# Patient Record
Sex: FEMALE | Race: WHITE | NOT HISPANIC OR LATINO | Employment: OTHER | URBAN - METROPOLITAN AREA
[De-identification: names, ages, dates, MRNs, and addresses within clinical notes are randomized per-mention and may not be internally consistent; named-entity substitution may affect disease eponyms.]

---

## 2015-09-22 LAB — CRC RECOMMENDATION EXT: NORMAL

## 2021-09-29 LAB — BCS RECOMMENDATION EXT: NORMAL

## 2022-01-11 LAB
BUN SERPL-MCNC: 16 MG/DL (ref 7–18)
CALCIUM SERPL-MCNC: 10.3 MG/DL (ref 8.5–10.1)
CHLORIDE SERPL-SCNC: 101 MMOL/L (ref 98–107)
CHOLEST SERPL-MCNC: 166 MG/DL
CO2 SERPL-SCNC: 25 MMOL/L (ref 21–32)
CREAT SERPL-MCNC: 1.07 MG/DL (ref 0.6–1.3)
GLUCOSE SERPL-MCNC: 104 MG/DL (ref 74–106)
HDLC SERPL-MCNC: 45 MG/DL (ref 35–60)
LDLC SERPL CALC-MCNC: 94 MG/DL
POTASSIUM SERPL-SCNC: 4.5 MMOL/L (ref 3.5–5.1)
SODIUM SERPL-SCNC: 141 MEQ/L (ref 131–145)
TRIGL SERPL-MCNC: 157 MG/DL (ref 30–150)

## 2022-04-07 ENCOUNTER — HISTORICAL (OUTPATIENT)
Dept: ADMINISTRATIVE | Facility: HOSPITAL | Age: 67
End: 2022-04-07
Payer: MEDICARE

## 2022-04-24 VITALS
OXYGEN SATURATION: 97 % | HEIGHT: 61 IN | DIASTOLIC BLOOD PRESSURE: 78 MMHG | WEIGHT: 154 LBS | SYSTOLIC BLOOD PRESSURE: 136 MMHG | BODY MASS INDEX: 29.07 KG/M2

## 2022-06-08 ENCOUNTER — LAB REQUISITION (OUTPATIENT)
Dept: LAB | Facility: HOSPITAL | Age: 67
End: 2022-06-08
Payer: MEDICARE

## 2022-06-08 DIAGNOSIS — L73.8 OTHER SPECIFIED FOLLICULAR DISORDERS: ICD-10-CM

## 2022-06-08 PROCEDURE — 87070 CULTURE OTHR SPECIMN AEROBIC: CPT | Performed by: DERMATOLOGY

## 2022-06-10 LAB — BACTERIA SPEC CULT: ABNORMAL

## 2022-08-03 ENCOUNTER — PATIENT MESSAGE (OUTPATIENT)
Dept: INTERNAL MEDICINE | Facility: CLINIC | Age: 67
End: 2022-08-03
Payer: MEDICARE

## 2022-08-03 DIAGNOSIS — I10 HYPERTENSION, UNSPECIFIED TYPE: ICD-10-CM

## 2022-08-03 DIAGNOSIS — E03.9 HYPOTHYROIDISM, UNSPECIFIED TYPE: ICD-10-CM

## 2022-08-03 DIAGNOSIS — E78.5 HYPERLIPIDEMIA, UNSPECIFIED HYPERLIPIDEMIA TYPE: ICD-10-CM

## 2022-08-03 DIAGNOSIS — Z11.59 NEED FOR HEPATITIS C SCREENING TEST: Primary | ICD-10-CM

## 2022-08-30 ENCOUNTER — PATIENT MESSAGE (OUTPATIENT)
Dept: INTERNAL MEDICINE | Facility: CLINIC | Age: 67
End: 2022-08-30
Payer: MEDICARE

## 2022-09-15 ENCOUNTER — HISTORICAL (OUTPATIENT)
Dept: ADMINISTRATIVE | Facility: HOSPITAL | Age: 67
End: 2022-09-15
Payer: MEDICARE

## 2022-11-14 ENCOUNTER — DOCUMENTATION ONLY (OUTPATIENT)
Dept: INTERNAL MEDICINE | Facility: CLINIC | Age: 67
End: 2022-11-14
Payer: MEDICARE

## 2022-12-20 ENCOUNTER — DOCUMENTATION ONLY (OUTPATIENT)
Dept: INTERNAL MEDICINE | Facility: CLINIC | Age: 67
End: 2022-12-20
Payer: MEDICARE

## 2023-01-13 ENCOUNTER — TELEPHONE (OUTPATIENT)
Dept: INTERNAL MEDICINE | Facility: CLINIC | Age: 68
End: 2023-01-13
Payer: MEDICARE

## 2023-01-13 NOTE — TELEPHONE ENCOUNTER
----- Message from Latoya Vaughan LPN sent at 1/13/2023 10:51 AM CST -----  Regarding: FOREST Sanchez 1/20/23 @0900  Are there any outstanding tasks in the chart? Pt will need fasting labs     Is there any documentation of tasks? no    Has patient seen another physician, been to the ER, [facility], or admitted to hospital since last visit?    Has the patient done blood work or imaging since last visit?

## 2023-01-14 LAB
ABO GROUP BLD: NORMAL
ALBUMIN SERPL-MCNC: 5.2 G/DL (ref 3.8–4.8)
ALBUMIN/GLOB SERPL: 2 {RATIO} (ref 1.2–2.2)
ALP SERPL-CCNC: 84 IU/L (ref 44–121)
ALT SERPL-CCNC: 32 IU/L (ref 0–32)
AST SERPL-CCNC: 31 IU/L (ref 0–40)
BASOPHILS # BLD AUTO: 0 X10E3/UL (ref 0–0.2)
BASOPHILS NFR BLD AUTO: 1 %
BILIRUB SERPL-MCNC: 0.4 MG/DL (ref 0–1.2)
BUN SERPL-MCNC: 17 MG/DL (ref 8–27)
BUN/CREAT SERPL: 18 (ref 12–28)
CALCIUM SERPL-MCNC: 10.5 MG/DL (ref 8.7–10.3)
CHLORIDE SERPL-SCNC: 96 MMOL/L (ref 96–106)
CHOLEST SERPL-MCNC: 186 MG/DL (ref 100–199)
CO2 SERPL-SCNC: 26 MMOL/L (ref 20–29)
CREAT SERPL-MCNC: 0.94 MG/DL (ref 0.57–1)
EOSINOPHIL # BLD AUTO: 0.1 X10E3/UL (ref 0–0.4)
EOSINOPHIL NFR BLD AUTO: 2 %
ERYTHROCYTE [DISTWIDTH] IN BLOOD BY AUTOMATED COUNT: 12.4 % (ref 11.7–15.4)
EST. GFR  (NO RACE VARIABLE): 67 ML/MIN/1.73
GLOBULIN SER CALC-MCNC: 2.6 G/DL (ref 1.5–4.5)
GLUCOSE SERPL-MCNC: 101 MG/DL (ref 70–99)
HCT VFR BLD AUTO: 42.2 % (ref 34–46.6)
HCV AB S/CO SERPL IA: <0.1 S/CO RATIO (ref 0–0.9)
HDLC SERPL-MCNC: 43 MG/DL
HGB BLD-MCNC: 14.3 G/DL (ref 11.1–15.9)
IMM GRANULOCYTES NFR BLD AUTO: 0 %
LDLC SERPL CALC-MCNC: 110 MG/DL (ref 0–99)
LYMPHOCYTES # BLD AUTO: 2.7 X10E3/UL (ref 0.7–3.1)
LYMPHOCYTES NFR BLD AUTO: 35 %
MCH RBC QN AUTO: 33.6 PG (ref 26.6–33)
MCHC RBC AUTO-ENTMCNC: 33.9 G/DL (ref 31.5–35.7)
MCV RBC AUTO: 99 FL (ref 79–97)
MONOCYTES # BLD AUTO: 0.6 X10E3/UL (ref 0.1–0.9)
MONOCYTES NFR BLD AUTO: 8 %
NEUTROPHILS # BLD AUTO: 4.1 X10E3/UL (ref 1.4–7)
NEUTROPHILS NFR BLD AUTO: 54 %
PLATELET # BLD AUTO: 414 X10E3/UL (ref 150–450)
POTASSIUM SERPL-SCNC: 4.7 MMOL/L (ref 3.5–5.2)
PROT SERPL-MCNC: 7.8 G/DL (ref 6–8.5)
RBC # BLD AUTO: 4.26 X10E6/UL (ref 3.77–5.28)
RH BLD: POSITIVE
SODIUM SERPL-SCNC: 140 MMOL/L (ref 134–144)
SPECIMEN STATUS REPORT: NORMAL
T4 FREE SERPL-MCNC: 1.83 NG/DL (ref 0.82–1.77)
TRIGL SERPL-MCNC: 189 MG/DL (ref 0–149)
TSH SERPL DL<=0.005 MIU/L-ACNC: 0.82 UIU/ML (ref 0.45–4.5)
VLDLC SERPL CALC-MCNC: 33 MG/DL (ref 5–40)
WBC # BLD AUTO: 7.5 X10E3/UL (ref 3.4–10.8)

## 2023-01-20 ENCOUNTER — OFFICE VISIT (OUTPATIENT)
Dept: INTERNAL MEDICINE | Facility: CLINIC | Age: 68
End: 2023-01-20
Payer: MEDICARE

## 2023-01-20 VITALS
RESPIRATION RATE: 18 BRPM | OXYGEN SATURATION: 99 % | DIASTOLIC BLOOD PRESSURE: 72 MMHG | WEIGHT: 153 LBS | HEIGHT: 61 IN | SYSTOLIC BLOOD PRESSURE: 148 MMHG | BODY MASS INDEX: 28.89 KG/M2 | HEART RATE: 102 BPM

## 2023-01-20 DIAGNOSIS — Z00.00 ENCOUNTER FOR MEDICARE ANNUAL WELLNESS EXAM: ICD-10-CM

## 2023-01-20 DIAGNOSIS — I10 BENIGN HYPERTENSION: ICD-10-CM

## 2023-01-20 DIAGNOSIS — R01.1 MURMUR: ICD-10-CM

## 2023-01-20 DIAGNOSIS — E03.9 HYPOTHYROIDISM, UNSPECIFIED TYPE: ICD-10-CM

## 2023-01-20 DIAGNOSIS — E78.5 HYPERLIPIDEMIA, UNSPECIFIED HYPERLIPIDEMIA TYPE: ICD-10-CM

## 2023-01-20 PROBLEM — L68.0 HIRSUTISM: Status: ACTIVE | Noted: 2023-01-20

## 2023-01-20 PROCEDURE — G0439 PR MEDICARE ANNUAL WELLNESS SUBSEQUENT VISIT: ICD-10-PCS | Mod: ,,, | Performed by: INTERNAL MEDICINE

## 2023-01-20 PROCEDURE — G0439 PPPS, SUBSEQ VISIT: HCPCS | Mod: ,,, | Performed by: INTERNAL MEDICINE

## 2023-01-20 RX ORDER — MONTELUKAST SODIUM 10 MG/1
10 TABLET ORAL NIGHTLY
COMMUNITY
Start: 2022-10-07 | End: 2023-01-23

## 2023-01-20 RX ORDER — VALACYCLOVIR HYDROCHLORIDE 500 MG/1
TABLET, FILM COATED ORAL
COMMUNITY
Start: 2022-10-07

## 2023-01-20 RX ORDER — VALSARTAN 80 MG/1
80 TABLET ORAL DAILY
Qty: 90 TABLET | Refills: 3 | Status: SHIPPED | OUTPATIENT
Start: 2023-01-20 | End: 2023-09-20 | Stop reason: SDUPTHER

## 2023-01-20 RX ORDER — HYDROCHLOROTHIAZIDE 25 MG/1
25 TABLET ORAL DAILY
COMMUNITY
Start: 2022-01-18 | End: 2023-03-17

## 2023-01-20 RX ORDER — PROGESTERONE 100 MG/1
100 CAPSULE ORAL DAILY
COMMUNITY
Start: 2022-11-12

## 2023-01-20 RX ORDER — KETOCONAZOLE 20 MG/ML
SHAMPOO, SUSPENSION TOPICAL
COMMUNITY
Start: 2022-08-18 | End: 2024-03-25

## 2023-01-20 RX ORDER — ROSUVASTATIN CALCIUM 10 MG/1
10 TABLET, COATED ORAL DAILY
Qty: 90 TABLET | Refills: 3 | Status: SHIPPED | OUTPATIENT
Start: 2023-01-20 | End: 2023-11-08

## 2023-01-20 RX ORDER — ROSUVASTATIN CALCIUM 5 MG/1
5 TABLET, COATED ORAL NIGHTLY
COMMUNITY
Start: 2022-01-18 | End: 2023-01-20

## 2023-01-20 RX ORDER — LEVOTHYROXINE SODIUM 112 UG/1
112 TABLET ORAL
COMMUNITY
Start: 2022-01-18 | End: 2023-01-23

## 2023-01-20 NOTE — PROGRESS NOTES
Subjective:        Patient Care Team:  Noris Sanchez MD as PCP - General (Internal Medicine)  Noris Sanchez MD     Chief Complaint: Medicare AWV (Discuss labs /)      HPI:She is here for a medicare wellness.  No complaints.  She does check her BP at home.  This morning it was 135/88.  Its usually [id number].  She isn't exercising.  She occ feels a tightness in her chest but not chest pain.  No palpitations.  No excessive cold or heat intolerance.  No tremor.   Problem Noted   Hirsutism 2023   Benign Hypertension 2023   Hyperlipidemia 2023   Hypothyroidism 2023   Encounter for Medicare Annual Wellness Exam 2023   Murmur 2023        The patient's Health Maintenance was reviewed and the following appears to be due:   Health Maintenance Due   Topic Date Due    COVID-19 Vaccine (5 - Booster) 2022    Influenza Vaccine (1) 2022       Past Medical History:  Past Medical History:   Diagnosis Date    Abnormal facial hair     Benign essential HTN     HLD (hyperlipidemia)     Hypothyroidism, unspecified      Past Surgical History:   Procedure Laterality Date     SECTION  15 years ago    HYSTERECTOMY  13 years ago    SKIN CANCER EXCISION Bilateral     2022, Oct 2022    TONSILLECTOMY  When i was 9 years old    TOTAL ABDOMINAL HYSTERECTOMY W/ BILATERAL SALPINGOOPHORECTOMY       Review of patient's allergies indicates:  No Known Allergies  Current Outpatient Medications on File Prior to Visit   Medication Sig Dispense Refill    hydroCHLOROthiazide (HYDRODIURIL) 25 MG tablet Take 25 mg by mouth once daily.      ketoconazole (NIZORAL) 2 % shampoo       levothyroxine (SYNTHROID) 112 MCG tablet Take 112 mcg by mouth before breakfast.      montelukast (SINGULAIR) 10 mg tablet Take 10 mg by mouth every evening.      progesterone (PROMETRIUM) 100 MG capsule Take 100 mg by mouth once daily.      valACYclovir (VALTREX) 500 MG tablet       [DISCONTINUED] rosuvastatin  (CRESTOR) 5 MG tablet Take 5 mg by mouth every evening.       No current facility-administered medications on file prior to visit.     Social History     Socioeconomic History    Marital status:    Tobacco Use    Smoking status: Never    Smokeless tobacco: Never   Substance and Sexual Activity    Alcohol use: Yes     Alcohol/week: 3.0 standard drinks     Types: 3 Glasses of wine per week    Drug use: Never    Sexual activity: Yes     Partners: Male     Birth control/protection: None     Family History   Problem Relation Age of Onset    Hyperlipidemia Mother     Hypertension Mother     Heart disease Mother     Hypertension Father     Stroke Father     Heart disease Father     Breast cancer Sister        Review of Systems    Patient Reported Health Risk Assessment  What [age] 65-69  Are you male or female?: Female  During the past four weeks, how much have you been bothered by emotional problems such as feeling anxious, depressed, irritable, sad, or downhearted and blue?: Not at all  During the past five weeks, has your physical and/or emotional health limited your social activities with family, friends, neighbors, or groups?: Not at all  During the past four weeks, how much bodily pain have you generally had?: No pain  During the past four weeks, was someone available to help if you needed and wanted help?: Yes, as much as I wanted  During the past four weeks, what was the hardest physical activity you could do for at least two minutes?: Moderate  Can you get to places out of walking distance without help?  (For example, can you travel alone on buses or taxis, or drive your own car?): Yes  Can you go shopping for groceries or clothes without someone's help?: Yes  Can you prepare your own meals?: Yes  Can you do your own housework without help?: Yes  Because of any health problems, do you need the help of another person with your personal care needs such as eating, bathing, dressing, or getting around  the house?: No  Can you handle your own money without help?: Yes  During the past four weeks, how would you rate your health in general?: Excellent  How have things been going for you during the past four weeks?: Very well  Are you having difficulties driving your car?: No  Do you always fasten your seat belt when you are in a car?: Yes, usually  How often in the past four weeks have you been bothered by falling or dizzy when standing up?: Never  How often in the past four weeks have you been bothered by sexual problems?: Never  How often in the past four weeks have you been bothered by trouble eating well?: Never  How often in the past four weeks have you been bothered by teeth or denture problems?: Never  How often in the past four weeks have you been bothered with problems using the telephone?: Never  How often in the past four weeks have you been bothered by tiredness or fatigue?: Never  Have you fallen two or more times in the past year?: No  Are you afraid of falling?: No  Are you a smoker?: No  During the past four weeks, how many drinks of wine, beer, or other alcoholic beverages did you have?: 2-5 drinks per weeks  Do you exercise for about 20 minutes three or more days a week?: No, I usually do not exercise this much  Have you been given any information to help you with hazards in your house that might hurt you?: Yes  Have you been given any information to help you with keeping track of your medications?: Yes  How often do you have trouble taking medicines the way you've been told to take them?: I always take them as prescribed  How confident are you that you can control and manage most of your health problems?: Very confident  What is your race? (Check all that apply.):     Opioid Screening: Patient medication list reviewed, patient is not taking prescription opioids. Patient is not using additional opioids than prescribed. Patient is at low risk of substance abuse based on this opioid use  "history.     Objective:   BP (!) 148/72 (BP Location: Right arm, Patient Position: Sitting, BP Method: Small (Manual))   Pulse 102   Resp 18   Ht 5' 0.63" (1.54 m)   Wt 69.4 kg (153 lb)   SpO2 99%   BMI 29.26 kg/m²     Physical Exam  Constitutional:       General: She is not in acute distress.     Appearance: Normal appearance.   HENT:      Head: Normocephalic and atraumatic.   Eyes:      General: No scleral icterus.     Conjunctiva/sclera: Conjunctivae normal.   Neck:      Vascular: No carotid bruit.   Cardiovascular:      Rate and Rhythm: Normal rate and regular rhythm.      Pulses: Normal pulses.      Heart sounds: Murmur (II/VI msm RUSB) heard.     No friction rub. No gallop.   Pulmonary:      Effort: Pulmonary effort is normal.      Breath sounds: Normal breath sounds.   Abdominal:      General: Bowel sounds are normal.      Palpations: Abdomen is soft. There is no mass.      Tenderness: There is no abdominal tenderness. There is no guarding or rebound.   Musculoskeletal:         General: No deformity.      Cervical back: No rigidity or tenderness.      Right lower leg: No edema.      Left lower leg: No edema.   Lymphadenopathy:      Cervical: No cervical adenopathy.   Skin:     Coloration: Skin is not jaundiced or pale.      Findings: No erythema.   Neurological:      General: No focal deficit present.      Mental Status: She is alert and oriented to person, place, and time.      Gait: Gait normal.   Psychiatric:         Mood and Affect: Mood normal.         Behavior: Behavior normal.         Thought Content: Thought content normal.         Judgment: Judgment normal.         No flowsheet data found.  Fall Risk Assessment - Outpatient 1/20/2023   Mobility Status Ambulatory   Number of falls 0   Identified as fall risk 0           Depression Screening  Over the past two weeks, has the patient felt down, depressed, or hopeless?: No  Over the past two weeks, has the patient felt little interest or pleasure " in doing things?: No  Functional Ability/Safety Screening  Was the patient's timed Up & Go test unsteady or longer than 30 seconds?: No  Does the patient need help with phone, transportation, shopping, preparing meals, housework, laundry, meds, or managing money?: No  Does the patient's home have rugs in the hallway, lack grab bars in the bathroom, lack handrails on the stairs or have poor lighting?: No  Have you noticed any hearing difficulties?: No  Cognitive Function (Assessed through direct observation with due consideration of information obtained by way of patient reports and/or concerns raised by family, friends, caretakers, or others)    Does the patient repeat questions/statements in the same day?: No  Does the patient have trouble remembering the date, year, and time?: No  Does the patient have difficulty managing finances?: No  Does the patient have a decreased sense of direction?: No    Assessment and Plan:     Encounter for Medicare annual wellness exam  Health Maintenance Due   Topic Date Due    TETANUS VACCINE  Never done    COVID-19 Vaccine (4 - Booster for Moderna series) 01/03/2022     Recent labs reviewed and discussed.  Dr. Bolton orders her mmg.      Murmur  Will get echo to further evaluate -- no h/o murmur.    Hypothyroidism  Free t4 is a little high,  Tsh is normal.  CCM for now.    Hyperlipidemia  Increase crestor to more moderate dose.    Benign hypertension  Cont hctz, add diovan for better control.  Recheck 6 weeks.     Follow up in about 6 weeks (around 3/3/2023).    Medications Ordered This Encounter   Medications    rosuvastatin (CRESTOR) 10 MG tablet     Sig: Take 1 tablet (10 mg total) by mouth once daily.     Dispense:  90 tablet     Refill:  3    valsartan (DIOVAN) 80 MG tablet     Sig: Take 1 tablet (80 mg total) by mouth once daily.     Dispense:  90 tablet     Refill:  3     .     [unfilled]  Orders Placed This Encounter   Procedures    Echo Saline Bubble? No     Standing  Status:   Future     Standing Expiration Date:   1/20/2024     Order Specific Question:   Limited Echo?     Answer:   No     Order Specific Question:   Saline Bubble?     Answer:   No     Order Specific Question:   Ultrasound enhancing contrast?     Answer:   No     Order Specific Question:   Adult congenital patient?     Answer:   No     Order Specific Question:   Oncology Patient?     Answer:   No     Order Specific Question:   Release to patient     Answer:   Immediate         Medicare Annual Wellness and Personalized Prevention Plan:   Fall Risk + Home Safety + Hearing Impairment + Depression Screen + Cognitive Impairment Screen + Health Risk Assessment all reviewed    Advance Care Planning   I attest to discussing Advance Care Planning with patient and/or family member.  Education was provided including the importance of the Health Care Power of , Advance Directives, and/or [place] documentation.  The patient expressed understanding to the importance of this information and discussion.       Follow up in about 6 weeks (around 3/3/2023). In addition to their scheduled follow up, the patient has also been instructed to follow up on as needed basis.

## 2023-01-20 NOTE — ASSESSMENT & PLAN NOTE
Health Maintenance Due   Topic Date Due    TETANUS VACCINE  Never done    COVID-19 Vaccine (4 - Booster for Moderna series) 01/03/2022     Recent labs reviewed and discussed.  Dr. Bolton orders her mmg.

## 2023-02-13 ENCOUNTER — HOSPITAL ENCOUNTER (OUTPATIENT)
Dept: CARDIOLOGY | Facility: HOSPITAL | Age: 68
Discharge: HOME | End: 2023-02-13
Attending: INTERNAL MEDICINE
Payer: MEDICARE

## 2023-02-13 VITALS — HEIGHT: 60 IN | BODY MASS INDEX: 29.88 KG/M2

## 2023-02-13 DIAGNOSIS — R01.1 MURMUR: ICD-10-CM

## 2023-02-13 LAB
AV INDEX (PROSTH): 0.57
AV MEAN GRADIENT: 6 MMHG
AV PEAK GRADIENT: 12 MMHG
AV REGURGITATION PRESSURE HALF TIME: 365 MS
AV VALVE AREA: 1.79 CM2
AV VELOCITY RATIO: 0.55
CV ECHO LV RWT: 0.53 CM
DOP CALC AO PEAK VEL: 1.7 M/S
DOP CALC AO VTI: 31.1 CM
DOP CALC LVOT AREA: 3.1 CM2
DOP CALC LVOT DIAMETER: 2 CM
DOP CALC LVOT PEAK VEL: 0.93 M/S
DOP CALC LVOT STROKE VOLUME: 55.58 CM3
DOP CALC MV VTI: 31.7 CM
DOP CALCLVOT PEAK VEL VTI: 17.7 CM
E WAVE DECELERATION TIME: 85 MSEC
E/A RATIO: 1.05
E/E' RATIO: 10.24 M/S
ECHO LV POSTERIOR WALL: 1.01 CM (ref 0.6–1.1)
EJECTION FRACTION: 59 %
FRACTIONAL SHORTENING: 30 % (ref 28–44)
INTERVENTRICULAR SEPTUM: 0.87 CM (ref 0.6–1.1)
LEFT ATRIUM SIZE: 3.1 CM
LEFT ATRIUM VOLUME MOD: 37.4 CM3
LEFT INTERNAL DIMENSION IN SYSTOLE: 2.67 CM (ref 2.1–4)
LEFT VENTRICLE DIASTOLIC VOLUME: 61.6 ML
LEFT VENTRICLE SYSTOLIC VOLUME: 26.3 ML
LEFT VENTRICULAR INTERNAL DIMENSION IN DIASTOLE: 3.79 CM (ref 3.5–6)
LEFT VENTRICULAR MASS: 106.97 G
LV LATERAL E/E' RATIO: 9.67 M/S
LV SEPTAL E/E' RATIO: 10.88 M/S
LVOT MG: 2 MMHG
LVOT MV: 0.64 CM/S
MV MEAN GRADIENT: 3 MMHG
MV PEAK A VEL: 0.83 M/S
MV PEAK E VEL: 0.87 M/S
MV PEAK GRADIENT: 8 MMHG
MV STENOSIS PRESSURE HALF TIME: 42 MS
MV VALVE AREA BY CONTINUITY EQUATION: 1.75 CM2
MV VALVE AREA P 1/2 METHOD: 5.24 CM2
PISA AR MAX VEL: 4.67 M/S
PISA TR MAX VEL: 2.01 M/S
PV PEAK VELOCITY: 0.78 CM/S
RA PRESSURE: 3 MMHG
TDI LATERAL: 0.09 M/S
TDI SEPTAL: 0.08 M/S
TDI: 0.09 M/S
TR MAX PG: 16 MMHG
TRICUSPID ANNULAR PLANE SYSTOLIC EXCURSION: 1.78 CM
TV REST PULMONARY ARTERY PRESSURE: 19 MMHG

## 2023-02-13 PROCEDURE — 93306 TTE W/DOPPLER COMPLETE: CPT

## 2023-02-13 PROCEDURE — 93306 TTE W/DOPPLER COMPLETE: CPT | Mod: 26,,, | Performed by: STUDENT IN AN ORGANIZED HEALTH CARE EDUCATION/TRAINING PROGRAM

## 2023-02-13 PROCEDURE — 93306 ECHO (CUPID ONLY): ICD-10-PCS | Mod: 26,,, | Performed by: STUDENT IN AN ORGANIZED HEALTH CARE EDUCATION/TRAINING PROGRAM

## 2023-02-20 ENCOUNTER — TELEPHONE (OUTPATIENT)
Dept: INTERNAL MEDICINE | Facility: CLINIC | Age: 68
End: 2023-02-20
Payer: MEDICARE

## 2023-02-20 NOTE — TELEPHONE ENCOUNTER
----- Message from Noris Sanchez MD sent at 2/17/2023  7:35 AM CST -----  Tell patient the murmur I heard is from mild aortic regurgitation (slightly leaky aortic valve).  This is nothing to worry about at this time.  Mild valvular abnormalities are very common.  We might consider repeating an echo later on down the road if the murmur gets worse, but there is nothing to do at this time.

## 2023-02-27 ENCOUNTER — TELEPHONE (OUTPATIENT)
Dept: INTERNAL MEDICINE | Facility: CLINIC | Age: 68
End: 2023-02-27
Payer: MEDICARE

## 2023-02-27 NOTE — TELEPHONE ENCOUNTER
----- Message from Latoya Vaughan LPN sent at 2/27/2023  3:59 PM CST -----  Regarding: FOREST Sanchez 3/6/23 @11:00a  Are there any outstanding tasks in the chart? no    Is there any documentation of tasks? no    Has patient seen another physician, been to the ER, [facility], or admitted to hospital since last visit?    Has the patient done blood work or imaging since last visit?

## 2023-03-06 ENCOUNTER — OFFICE VISIT (OUTPATIENT)
Dept: INTERNAL MEDICINE | Facility: CLINIC | Age: 68
End: 2023-03-06
Payer: MEDICARE

## 2023-03-06 VITALS
WEIGHT: 155 LBS | RESPIRATION RATE: 18 BRPM | DIASTOLIC BLOOD PRESSURE: 80 MMHG | HEART RATE: 80 BPM | SYSTOLIC BLOOD PRESSURE: 124 MMHG | OXYGEN SATURATION: 99 % | HEIGHT: 60 IN | BODY MASS INDEX: 30.43 KG/M2

## 2023-03-06 DIAGNOSIS — I10 BENIGN HYPERTENSION: Primary | ICD-10-CM

## 2023-03-06 DIAGNOSIS — E78.5 HYPERLIPIDEMIA, UNSPECIFIED HYPERLIPIDEMIA TYPE: ICD-10-CM

## 2023-03-06 PROCEDURE — 99213 OFFICE O/P EST LOW 20 MIN: CPT | Mod: ,,, | Performed by: INTERNAL MEDICINE

## 2023-03-06 PROCEDURE — 99213 PR OFFICE/OUTPT VISIT, EST, LEVL III, 20-29 MIN: ICD-10-PCS | Mod: ,,, | Performed by: INTERNAL MEDICINE

## 2023-03-06 NOTE — PROGRESS NOTES
Subjective:      Chief Complaint: Follow-up (6wk- for BP check/)      HPI:  She is here for f/u htn.  She is tolerating the diovan without issue.  BP has been great.  108-121/60s-77.    Problem Noted   Hirsutism 1/20/2023   Benign Hypertension 1/20/2023   Hyperlipidemia 1/20/2023   Hypothyroidism 1/20/2023   Encounter for Medicare Annual Wellness Exam 1/20/2023   Murmur 1/20/2023        The patient's Health Maintenance was reviewed and the following appears to be due:   Health Maintenance Due   Topic Date Due    Hemoglobin A1c (Diabetic Prevention Screening)  Never done       Past Medical History:  Past Medical History:   Diagnosis Date    Abnormal facial hair     Benign essential HTN     HLD (hyperlipidemia)     Hypothyroidism, unspecified      Review of patient's allergies indicates:  No Known Allergies  Current Outpatient Medications on File Prior to Visit   Medication Sig Dispense Refill    hydroCHLOROthiazide (HYDRODIURIL) 25 MG tablet Take 25 mg by mouth once daily.      ketoconazole (NIZORAL) 2 % shampoo       levothyroxine (SYNTHROID) 112 MCG tablet TAKE 1 TABLET EVERY DAY 90 tablet 3    montelukast (SINGULAIR) 10 mg tablet TAKE 1 TABLET EVERY DAY 90 tablet 3    progesterone (PROMETRIUM) 100 MG capsule Take 100 mg by mouth once daily.      rosuvastatin (CRESTOR) 10 MG tablet Take 1 tablet (10 mg total) by mouth once daily. 90 tablet 3    valACYclovir (VALTREX) 500 MG tablet       valsartan (DIOVAN) 80 MG tablet Take 1 tablet (80 mg total) by mouth once daily. 90 tablet 3     No current facility-administered medications on file prior to visit.       Review of Systems    Objective:   /80 (BP Location: Left arm, Patient Position: Sitting, BP Method: Small (Manual))   Pulse 80   Resp 18   Ht 5' (1.524 m)   Wt 70.3 kg (155 lb)   SpO2 99%   BMI 30.27 kg/m²     Physical Exam  Vitals reviewed.   Constitutional:       General: She is not in acute distress.     Appearance: Normal appearance. She is not  ill-appearing or diaphoretic.   HENT:      Head: Normocephalic and atraumatic.   Pulmonary:      Effort: Pulmonary effort is normal.   Skin:     General: Skin is warm and dry.   Neurological:      General: No focal deficit present.      Mental Status: She is alert.   Psychiatric:         Mood and Affect: Mood normal.         Behavior: Behavior normal.         Thought Content: Thought content normal.         Judgment: Judgment normal.     Assessment and Plan:     Benign hypertension  Improved.  Continue Diovan and hctz.    Hyperlipidemia  Recheck lipids/lft in a month to f/u on addition of crestor.      Follow up in about 6 months (around 9/6/2023).       [unfilled]  Orders Placed This Encounter   Procedures    Lipid Panel     Standing Status:   Future     Number of Occurrences:   1     Standing Expiration Date:   5/4/2024    Comprehensive Metabolic Panel     Standing Status:   Future     Number of Occurrences:   1     Standing Expiration Date:   5/4/2024

## 2023-04-05 LAB
ALBUMIN SERPL-MCNC: 4.7 G/DL (ref 3.8–4.8)
ALBUMIN/GLOB SERPL: 1.9 {RATIO} (ref 1.2–2.2)
ALP SERPL-CCNC: 71 IU/L (ref 44–121)
ALT SERPL-CCNC: 33 IU/L (ref 0–32)
AST SERPL-CCNC: 28 IU/L (ref 0–40)
BILIRUB SERPL-MCNC: 0.5 MG/DL (ref 0–1.2)
BUN SERPL-MCNC: 18 MG/DL (ref 8–27)
BUN/CREAT SERPL: 20 (ref 12–28)
CALCIUM SERPL-MCNC: 10.3 MG/DL (ref 8.7–10.3)
CHLORIDE SERPL-SCNC: 98 MMOL/L (ref 96–106)
CHOLEST SERPL-MCNC: 166 MG/DL (ref 100–199)
CO2 SERPL-SCNC: 25 MMOL/L (ref 20–29)
CREAT SERPL-MCNC: 0.9 MG/DL (ref 0.57–1)
EST. GFR  (NO RACE VARIABLE): 70 ML/MIN/1.73
GLOBULIN SER CALC-MCNC: 2.5 G/DL (ref 1.5–4.5)
GLUCOSE SERPL-MCNC: 101 MG/DL (ref 70–99)
HDLC SERPL-MCNC: 39 MG/DL
LDLC SERPL CALC-MCNC: 95 MG/DL (ref 0–99)
POTASSIUM SERPL-SCNC: 5 MMOL/L (ref 3.5–5.2)
PROT SERPL-MCNC: 7.2 G/DL (ref 6–8.5)
SODIUM SERPL-SCNC: 137 MMOL/L (ref 134–144)
TRIGL SERPL-MCNC: 183 MG/DL (ref 0–149)
VLDLC SERPL CALC-MCNC: 32 MG/DL (ref 5–40)

## 2023-04-06 ENCOUNTER — DOCUMENTATION ONLY (OUTPATIENT)
Dept: INTERNAL MEDICINE | Facility: CLINIC | Age: 68
End: 2023-04-06
Payer: MEDICARE

## 2023-04-24 PROBLEM — Z00.00 ENCOUNTER FOR MEDICARE ANNUAL WELLNESS EXAM: Status: RESOLVED | Noted: 2023-01-20 | Resolved: 2023-04-24

## 2023-07-31 ENCOUNTER — OFFICE VISIT (OUTPATIENT)
Dept: INTERNAL MEDICINE | Facility: CLINIC | Age: 68
End: 2023-07-31
Payer: MEDICARE

## 2023-07-31 VITALS
SYSTOLIC BLOOD PRESSURE: 138 MMHG | WEIGHT: 158 LBS | BODY MASS INDEX: 31.02 KG/M2 | HEART RATE: 56 BPM | OXYGEN SATURATION: 98 % | DIASTOLIC BLOOD PRESSURE: 86 MMHG | HEIGHT: 60 IN | RESPIRATION RATE: 18 BRPM

## 2023-07-31 DIAGNOSIS — M54.50 ACUTE RIGHT-SIDED LOW BACK PAIN WITHOUT SCIATICA: ICD-10-CM

## 2023-07-31 DIAGNOSIS — R60.0 LOWER EXTREMITY EDEMA: ICD-10-CM

## 2023-07-31 LAB
APPEARANCE UR: CLEAR
BACTERIA #/AREA URNS AUTO: ABNORMAL /HPF
BILIRUB UR QL STRIP.AUTO: NEGATIVE
COLOR UR: YELLOW
GLUCOSE UR QL STRIP.AUTO: NEGATIVE
KETONES UR QL STRIP.AUTO: NEGATIVE
LEUKOCYTE ESTERASE UR QL STRIP.AUTO: NEGATIVE
NITRITE UR QL STRIP.AUTO: NEGATIVE
PH UR STRIP.AUTO: 5.5 [PH]
PROT UR QL STRIP.AUTO: NEGATIVE
RBC #/AREA URNS AUTO: 6 /HPF
RBC UR QL AUTO: ABNORMAL
SP GR UR STRIP.AUTO: 1.01 (ref 1–1.03)
SQUAMOUS #/AREA URNS AUTO: <5 /HPF
UROBILINOGEN UR STRIP-ACNC: 0.2
WBC #/AREA URNS AUTO: <5 /HPF

## 2023-07-31 PROCEDURE — 99213 OFFICE O/P EST LOW 20 MIN: CPT | Mod: ,,, | Performed by: INTERNAL MEDICINE

## 2023-07-31 PROCEDURE — 99213 PR OFFICE/OUTPT VISIT, EST, LEVL III, 20-29 MIN: ICD-10-PCS | Mod: ,,, | Performed by: INTERNAL MEDICINE

## 2023-07-31 RX ORDER — MAGNESIUM 250 MG
1 TABLET ORAL DAILY
COMMUNITY

## 2023-07-31 RX ORDER — CALCIUM CARBONATE 600 MG
600 TABLET ORAL DAILY
COMMUNITY

## 2023-07-31 RX ORDER — ACETAMINOPHEN 500 MG
5000 TABLET ORAL DAILY
COMMUNITY

## 2023-07-31 NOTE — PROGRESS NOTES
Subjective:      Chief Complaint: Low-back Pain (C/o back pain while she was on [place]- she wants to do urine test to rule out UTI /C/o ankle and leg swelling )      HPI:She was having some lower back pain and ankle swelling while traveling last week.  The left was worse than the right.  The back pain was a right lower back pain that was very uncomfortable in most positions.  It gradually improved over the past several days.  It was worse with certain movements as it tapered away.  It started about half way thru her trip.  She is worried about a UTI.  But she isn't having any dysuria.  She had some mild pain down into her right leg.  And she also had some burning in the right ankle.  She took motrin 400 mg bid for a few days, but none in 4 days.    The right lower back pain was worse with palpation and with cough.  Her spouse felt she was having difficulty breathing.  Problem Noted   Lower Back Pain 7/31/2023   Lower Extremity Edema 7/31/2023   Hirsutism 1/20/2023   Benign Hypertension 1/20/2023   Hyperlipidemia 1/20/2023   Hypothyroidism 1/20/2023   Murmur 1/20/2023   Encounter for Medicare Annual Wellness Exam (Resolved) 1/20/2023        The patient's Health Maintenance was reviewed and the following appears to be due:   Health Maintenance Due   Topic Date Due    Hemoglobin A1c (Diabetic Prevention Screening)  Never done    COVID-19 Vaccine (6 - Moderna series) 01/03/2022    Mammogram  10/03/2023       Past Medical History:  Past Medical History:   Diagnosis Date    Abnormal facial hair     Benign essential HTN     HLD (hyperlipidemia)     Hypothyroidism, unspecified      Review of patient's allergies indicates:  No Known Allergies  Current Outpatient Medications on File Prior to Visit   Medication Sig Dispense Refill    calcium carbonate (CALCIUM 600) 600 mg calcium (1,500 mg) Tab Take 600 mg by mouth once daily.      cholecalciferol, vitamin D3, 125 mcg (5,000 unit) Tab Take 5,000 Units by mouth  once daily.      hydroCHLOROthiazide (HYDRODIURIL) 25 MG tablet TAKE 1 TABLET EVERY DAY 90 tablet 3    ketoconazole (NIZORAL) 2 % shampoo       levothyroxine (SYNTHROID) 112 MCG tablet TAKE 1 TABLET EVERY DAY 90 tablet 3    magnesium 250 mg Tab Take 1 tablet by mouth once daily.      montelukast (SINGULAIR) 10 mg tablet TAKE 1 TABLET EVERY DAY 90 tablet 3    progesterone (PROMETRIUM) 100 MG capsule Take 100 mg by mouth once daily.      rosuvastatin (CRESTOR) 10 MG tablet Take 1 tablet (10 mg total) by mouth once daily. 90 tablet 3    valACYclovir (VALTREX) 500 MG tablet       valsartan (DIOVAN) 80 MG tablet Take 1 tablet (80 mg total) by mouth once daily. 90 tablet 3     No current facility-administered medications on file prior to visit.       Review of Systems   Constitutional:  Negative for fever.   Cardiovascular:  Negative for chest pain.   Gastrointestinal:  Negative for abdominal pain.   Genitourinary:  Negative for dysuria, hematuria and pelvic pain.   Musculoskeletal:  Positive for back pain.   Neurological:  Negative for weakness, numbness and headaches.       Objective:   /86 (BP Location: Right arm, Patient Position: Sitting, BP Method: Small (Manual))   Pulse (!) 56   Resp 18   Ht 5' (1.524 m)   Wt 71.7 kg (158 lb)   SpO2 98%   BMI 30.86 kg/m²     Physical Exam  Vitals reviewed.   Constitutional:       General: She is not in acute distress.     Appearance: Normal appearance. She is not ill-appearing or diaphoretic.   HENT:      Head: Normocephalic and atraumatic.   Pulmonary:      Effort: Pulmonary effort is normal.   Musculoskeletal:         General: No tenderness (no vertrebral point ttp and no flank pain).      Right lower leg: No edema.      Left lower leg: No edema.   Skin:     General: Skin is warm and dry.   Neurological:      General: No focal deficit present.      Mental Status: She is alert.   Psychiatric:         Mood and Affect: Mood normal.         Behavior: Behavior normal.          Thought Content: Thought content normal.         Judgment: Judgment normal.       Assessment and Plan:     Lower back pain  Will get u/a.  But sx nearly resolved at this point.    Lower extremity edema  Given travel, will check d-dimer.  But likely more related to travel.  Sx improved.      No follow-ups on file.       [unfilled]  Orders Placed This Encounter   Procedures    Urinalysis, Reflex to Urine Culture     Standing Status:   Future     Standing Expiration Date:   8/31/2023     Order Specific Question:   Specimen Source     Answer:   Urine    D-Dimer, Quantitative     Standing Status:   Future     Standing Expiration Date:   9/28/2024       Answers submitted by the patient for this visit:  Back Pain Questionnaire (Submitted on 7/31/2023)  Chief Complaint: Back pain  Chronicity: new  Onset: in the past 7 days  Frequency: constantly  Progression since onset: gradually improving  Pain location: costovertebral angle  Pain quality: aching  Radiates to: does not radiate  Pain - numeric: 5/10  Pain is: the same all the time  Aggravated by: coughing, position, standing  Stiffness is present: all day  leg pain: No  paresis: No  perianal numbness: No  tingling: No  weight loss: No  genital pain: No  Pain severity: moderate     Patient is a 66F with no reported PMH who presents to the ED s/p syncope.  Patient states that she got up from a chair and started to feel lightheaded.  Was able to walk down a flight of stairs but soon afterwards the lightheadedness became worse, patient became dizzy and passed out.  Patient reportedly had LOC for a few seconds, was aroused by her family who then tried to get the patient to her feet however she then syncopized again.  Family denies history of limb shaking, tongue biting, or bowel/bladder incontinence.  Patient reports a similar syncope last year in which she passed out when she was doing dishes.  Patient currently has no active complaints.  Takes no daily medications, allergic to sulfa, SHx - appendectomy many years ago.  Vitals stable, labs benign.  Will admit to tele.

## 2023-08-04 DIAGNOSIS — R31.9 HEMATURIA, UNSPECIFIED TYPE: ICD-10-CM

## 2023-08-04 DIAGNOSIS — M54.50 ACUTE RIGHT-SIDED LOW BACK PAIN WITHOUT SCIATICA: Primary | ICD-10-CM

## 2023-09-07 ENCOUNTER — PATIENT MESSAGE (OUTPATIENT)
Dept: RESEARCH | Facility: HOSPITAL | Age: 68
End: 2023-09-07
Payer: MEDICARE

## 2023-09-13 ENCOUNTER — TELEPHONE (OUTPATIENT)
Dept: INTERNAL MEDICINE | Facility: CLINIC | Age: 68
End: 2023-09-13
Payer: MEDICARE

## 2023-09-13 NOTE — TELEPHONE ENCOUNTER
----- Message from Latoya Vaughan LPN sent at 9/13/2023 11:52 AM CDT -----  Regarding: FOREST Sanchez 9/20/23 @0299  Are there any outstanding tasks in the chart? Pt will need urine test- she should have the order that I mailed    Is there any documentation of tasks? no    Has patient seen another physician, been to the ER, C, or admitted to hospital since last visit?    Has the patient done blood work or imaging since last visit?

## 2023-09-19 ENCOUNTER — PATIENT MESSAGE (OUTPATIENT)
Dept: INTERNAL MEDICINE | Facility: CLINIC | Age: 68
End: 2023-09-19
Payer: MEDICARE

## 2023-09-19 LAB
APPEARANCE UR: CLEAR
BACTERIA #/AREA URNS HPF: ABNORMAL /[HPF]
BILIRUB UR QL STRIP: NEGATIVE
COLOR UR: YELLOW
CRYSTALS URNS MICRO: ABNORMAL
EPI CELLS #/AREA URNS HPF: >10 /HPF (ref 0–10)
GLUCOSE UR QL STRIP: NEGATIVE
HGB UR QL STRIP: ABNORMAL
KETONES UR QL STRIP: NEGATIVE
LEUKOCYTE ESTERASE UR QL STRIP: NEGATIVE
MICRO URNS: ABNORMAL
NITRITE UR QL STRIP: NEGATIVE
PH UR STRIP: 7.5 [PH] (ref 5–7.5)
PROT UR QL STRIP: NEGATIVE
RBC #/AREA URNS HPF: ABNORMAL /HPF (ref 0–2)
SP GR UR STRIP: 1.01 (ref 1–1.03)
URINALYSIS REFLEX: ABNORMAL
UROBILINOGEN UR STRIP-MCNC: 0.2 MG/DL (ref 0.2–1)
WBC #/AREA URNS HPF: ABNORMAL /HPF (ref 0–5)

## 2023-09-20 ENCOUNTER — OFFICE VISIT (OUTPATIENT)
Dept: INTERNAL MEDICINE | Facility: CLINIC | Age: 68
End: 2023-09-20
Payer: MEDICARE

## 2023-09-20 VITALS
DIASTOLIC BLOOD PRESSURE: 68 MMHG | BODY MASS INDEX: 30.43 KG/M2 | HEIGHT: 60 IN | SYSTOLIC BLOOD PRESSURE: 134 MMHG | WEIGHT: 155 LBS | OXYGEN SATURATION: 97 % | RESPIRATION RATE: 18 BRPM | HEART RATE: 85 BPM

## 2023-09-20 DIAGNOSIS — I10 BENIGN HYPERTENSION: Primary | ICD-10-CM

## 2023-09-20 DIAGNOSIS — E78.5 HYPERLIPIDEMIA, UNSPECIFIED HYPERLIPIDEMIA TYPE: ICD-10-CM

## 2023-09-20 DIAGNOSIS — E03.9 HYPOTHYROIDISM, UNSPECIFIED TYPE: ICD-10-CM

## 2023-09-20 DIAGNOSIS — R31.29 MICROSCOPIC HEMATURIA: ICD-10-CM

## 2023-09-20 PROCEDURE — 99213 PR OFFICE/OUTPT VISIT, EST, LEVL III, 20-29 MIN: ICD-10-PCS | Mod: ,,, | Performed by: INTERNAL MEDICINE

## 2023-09-20 PROCEDURE — 99213 OFFICE O/P EST LOW 20 MIN: CPT | Mod: ,,, | Performed by: INTERNAL MEDICINE

## 2023-09-20 RX ORDER — VALSARTAN 80 MG/1
80 TABLET ORAL DAILY
Qty: 90 TABLET | Refills: 3 | Status: SHIPPED | OUTPATIENT
Start: 2023-09-20 | End: 2024-09-19

## 2023-09-20 NOTE — PROGRESS NOTES
Subjective:      Chief Complaint: Follow-up (6mo/Discuss U/A)      HPI:She is here for f/u htn, hematuria.  She has no complaints.  Her systolic this am was 112 at home this morning.    Problem Noted   Microscopic Hematuria 9/20/2023    She's had this for over 20 years.     Lower Back Pain 7/31/2023   Hirsutism 1/20/2023   Benign Hypertension 1/20/2023   Hyperlipidemia 1/20/2023   Hypothyroidism 1/20/2023   Murmur 1/20/2023   Encounter for Medicare Annual Wellness Exam (Resolved) 1/20/2023        The patient's Health Maintenance was reviewed and the following appears to be due:   Health Maintenance Due   Topic Date Due    Hemoglobin A1c (Diabetic Prevention Screening)  Never done    COVID-19 Vaccine (6 - Moderna series) 01/03/2022    Influenza Vaccine (1) 09/01/2023    Mammogram  10/03/2023       Past Medical History:  Past Medical History:   Diagnosis Date    Abnormal facial hair     Benign essential HTN     HLD (hyperlipidemia)     Hypothyroidism, unspecified      Review of patient's allergies indicates:  No Known Allergies  Current Outpatient Medications on File Prior to Visit   Medication Sig Dispense Refill    calcium carbonate (CALCIUM 600) 600 mg calcium (1,500 mg) Tab Take 600 mg by mouth once daily.      cholecalciferol, vitamin D3, 125 mcg (5,000 unit) Tab Take 5,000 Units by mouth once daily.      hydroCHLOROthiazide (HYDRODIURIL) 25 MG tablet TAKE 1 TABLET EVERY DAY 90 tablet 3    ketoconazole (NIZORAL) 2 % shampoo       levothyroxine (SYNTHROID) 112 MCG tablet TAKE 1 TABLET EVERY DAY 90 tablet 3    magnesium 250 mg Tab Take 1 tablet by mouth once daily.      montelukast (SINGULAIR) 10 mg tablet TAKE 1 TABLET EVERY DAY 90 tablet 3    progesterone (PROMETRIUM) 100 MG capsule Take 100 mg by mouth once daily.      rosuvastatin (CRESTOR) 10 MG tablet Take 1 tablet (10 mg total) by mouth once daily. 90 tablet 3    valACYclovir (VALTREX) 500 MG tablet       [DISCONTINUED] valsartan (DIOVAN) 80 MG tablet Take 1  tablet (80 mg total) by mouth once daily. 90 tablet 3     No current facility-administered medications on file prior to visit.       Review of Systems    Objective:   /68 (BP Location: Left arm, Patient Position: Sitting, BP Method: Small (Manual))   Pulse 85   Resp 18   Ht 5' (1.524 m)   Wt 70.3 kg (155 lb)   SpO2 97%   BMI 30.27 kg/m²     Physical Exam  Vitals reviewed.   Constitutional:       General: She is not in acute distress.     Appearance: Normal appearance. She is not ill-appearing or diaphoretic.   HENT:      Head: Normocephalic and atraumatic.   Cardiovascular:      Rate and Rhythm: Normal rate and regular rhythm.      Heart sounds: Normal heart sounds.   Pulmonary:      Effort: Pulmonary effort is normal.      Breath sounds: Normal breath sounds.   Musculoskeletal:      Right lower leg: No edema.      Left lower leg: No edema.   Skin:     General: Skin is warm and dry.   Neurological:      General: No focal deficit present.      Mental Status: She is alert.   Psychiatric:         Mood and Affect: Mood normal.         Behavior: Behavior normal.         Thought Content: Thought content normal.         Judgment: Judgment normal.       Assessment and Plan:     Benign hypertension  Improved, cont diovan and hctz.    Microscopic hematuria  She had a negative w/u in the past with cystoscopy.      Follow up in about 6 months (around 3/20/2024).    Medications Ordered This Encounter   Medications    valsartan (DIOVAN) 80 MG tablet     Sig: Take 1 tablet (80 mg total) by mouth once daily.     Dispense:  90 tablet     Refill:  3     .     [unfilled]  Orders Placed This Encounter   Procedures    CBC Auto Differential     Standing Status:   Future     Standing Expiration Date:   11/18/2024    Comprehensive Metabolic Panel     Standing Status:   Future     Standing Expiration Date:   11/18/2024    Lipid Panel     Standing Status:   Future     Standing Expiration Date:   3/20/2025    TSH     Standing  Status:   Future     Standing Expiration Date:   11/18/2024    T4, Free     Standing Status:   Future     Standing Expiration Date:   11/18/2024

## 2023-10-05 LAB
BCS RECOMMENDATION EXT: NORMAL
BMD RECOMMENDATION EXT: NORMAL

## 2023-10-06 ENCOUNTER — DOCUMENTATION ONLY (OUTPATIENT)
Dept: INTERNAL MEDICINE | Facility: CLINIC | Age: 68
End: 2023-10-06
Payer: MEDICARE

## 2023-10-06 NOTE — PROGRESS NOTES
Let her know the bmd shows osteopenia.  Rec sufficient ca/d and regular weight bearing exercise to reduce risk of fractures and falls.

## 2023-10-09 ENCOUNTER — DOCUMENTATION ONLY (OUTPATIENT)
Dept: INTERNAL MEDICINE | Facility: CLINIC | Age: 68
End: 2023-10-09
Payer: MEDICARE

## 2023-11-08 DIAGNOSIS — E78.5 HYPERLIPIDEMIA, UNSPECIFIED HYPERLIPIDEMIA TYPE: Primary | ICD-10-CM

## 2023-11-08 RX ORDER — ROSUVASTATIN CALCIUM 10 MG/1
10 TABLET, COATED ORAL
Qty: 90 TABLET | Refills: 3 | Status: SHIPPED | OUTPATIENT
Start: 2023-11-08

## 2024-02-25 DIAGNOSIS — J45.909 ASTHMA, UNSPECIFIED ASTHMA SEVERITY, UNSPECIFIED WHETHER COMPLICATED, UNSPECIFIED WHETHER PERSISTENT: ICD-10-CM

## 2024-02-26 RX ORDER — MONTELUKAST SODIUM 10 MG/1
TABLET ORAL
Qty: 90 TABLET | Refills: 3 | Status: SHIPPED | OUTPATIENT
Start: 2024-02-26

## 2024-03-17 DIAGNOSIS — E03.9 HYPOTHYROIDISM, UNSPECIFIED TYPE: ICD-10-CM

## 2024-03-17 DIAGNOSIS — I10 BENIGN HYPERTENSION: ICD-10-CM

## 2024-03-18 ENCOUNTER — TELEPHONE (OUTPATIENT)
Dept: INTERNAL MEDICINE | Facility: CLINIC | Age: 69
End: 2024-03-18
Payer: MEDICARE

## 2024-03-18 LAB
ALBUMIN SERPL-MCNC: 5 G/DL (ref 3.9–4.9)
ALBUMIN/GLOB SERPL: 1.9 {RATIO} (ref 1.2–2.2)
ALP SERPL-CCNC: 74 IU/L (ref 44–121)
ALT SERPL-CCNC: 25 IU/L (ref 0–32)
AST SERPL-CCNC: 24 IU/L (ref 0–40)
BASOPHILS # BLD AUTO: 0.1 X10E3/UL (ref 0–0.2)
BASOPHILS NFR BLD AUTO: 1 %
BILIRUB SERPL-MCNC: 0.4 MG/DL (ref 0–1.2)
BUN SERPL-MCNC: 24 MG/DL (ref 8–27)
BUN/CREAT SERPL: 21 (ref 12–28)
CALCIUM SERPL-MCNC: 10.7 MG/DL (ref 8.7–10.3)
CHLORIDE SERPL-SCNC: 99 MMOL/L (ref 96–106)
CHOLEST SERPL-MCNC: 152 MG/DL (ref 100–199)
CO2 SERPL-SCNC: 24 MMOL/L (ref 20–29)
CREAT SERPL-MCNC: 1.12 MG/DL (ref 0.57–1)
EOSINOPHIL # BLD AUTO: 0.1 X10E3/UL (ref 0–0.4)
EOSINOPHIL NFR BLD AUTO: 1 %
ERYTHROCYTE [DISTWIDTH] IN BLOOD BY AUTOMATED COUNT: 12.3 % (ref 11.7–15.4)
EST. GFR  (NO RACE VARIABLE): 53 ML/MIN/1.73
GLOBULIN SER CALC-MCNC: 2.7 G/DL (ref 1.5–4.5)
GLUCOSE SERPL-MCNC: 95 MG/DL (ref 70–99)
HCT VFR BLD AUTO: 41.7 % (ref 34–46.6)
HDLC SERPL-MCNC: 41 MG/DL
HGB BLD-MCNC: 13.7 G/DL (ref 11.1–15.9)
IMM GRANULOCYTES NFR BLD AUTO: 0 %
LDLC SERPL CALC-MCNC: 83 MG/DL (ref 0–99)
LYMPHOCYTES # BLD AUTO: 2.6 X10E3/UL (ref 0.7–3.1)
LYMPHOCYTES NFR BLD AUTO: 41 %
MCH RBC QN AUTO: 33.3 PG (ref 26.6–33)
MCHC RBC AUTO-ENTMCNC: 32.9 G/DL (ref 31.5–35.7)
MCV RBC AUTO: 102 FL (ref 79–97)
MONOCYTES # BLD AUTO: 0.6 X10E3/UL (ref 0.1–0.9)
MONOCYTES NFR BLD AUTO: 9 %
NEUTROPHILS # BLD AUTO: 3.1 X10E3/UL (ref 1.4–7)
NEUTROPHILS NFR BLD AUTO: 48 %
PLATELET # BLD AUTO: 394 X10E3/UL (ref 150–450)
POTASSIUM SERPL-SCNC: 5 MMOL/L (ref 3.5–5.2)
PROT SERPL-MCNC: 7.7 G/DL (ref 6–8.5)
RBC # BLD AUTO: 4.11 X10E6/UL (ref 3.77–5.28)
SODIUM SERPL-SCNC: 140 MMOL/L (ref 134–144)
SPECIMEN STATUS REPORT: NORMAL
T4 FREE SERPL-MCNC: 1.89 NG/DL (ref 0.82–1.77)
TRIGL SERPL-MCNC: 162 MG/DL (ref 0–149)
TSH SERPL DL<=0.005 MIU/L-ACNC: 0.27 UIU/ML (ref 0.45–4.5)
VLDLC SERPL CALC-MCNC: 28 MG/DL (ref 5–40)
WBC # BLD AUTO: 6.5 X10E3/UL (ref 3.4–10.8)

## 2024-03-18 RX ORDER — HYDROCHLOROTHIAZIDE 25 MG/1
TABLET ORAL
Qty: 90 TABLET | Refills: 3 | Status: SHIPPED | OUTPATIENT
Start: 2024-03-18

## 2024-03-18 RX ORDER — LEVOTHYROXINE SODIUM 112 UG/1
TABLET ORAL
Qty: 90 TABLET | Refills: 3 | Status: SHIPPED | OUTPATIENT
Start: 2024-03-18 | End: 2024-03-25

## 2024-03-18 NOTE — TELEPHONE ENCOUNTER
----- Message from Latoya Vaughan LPN sent at 3/18/2024  8:23 AM CDT -----  Regarding: FOREST Sanchez 3/25/24 @[id number]  Are there any outstanding tasks in the chart? Pt will need fasting labs     Is there any documentation of tasks? no    Has patient seen another physician, been to the ER, C, or admitted to hospital since last visit?    Has the patient done blood work or imaging since last visit?

## 2024-03-25 ENCOUNTER — OFFICE VISIT (OUTPATIENT)
Dept: INTERNAL MEDICINE | Facility: CLINIC | Age: 69
End: 2024-03-25
Payer: MEDICARE

## 2024-03-25 VITALS
OXYGEN SATURATION: 98 % | DIASTOLIC BLOOD PRESSURE: 70 MMHG | WEIGHT: 156 LBS | BODY MASS INDEX: 30.63 KG/M2 | HEIGHT: 60 IN | HEART RATE: 76 BPM | SYSTOLIC BLOOD PRESSURE: 122 MMHG | RESPIRATION RATE: 18 BRPM

## 2024-03-25 DIAGNOSIS — I10 BENIGN HYPERTENSION: ICD-10-CM

## 2024-03-25 DIAGNOSIS — Z00.00 ENCOUNTER FOR MEDICARE ANNUAL WELLNESS EXAM: Primary | ICD-10-CM

## 2024-03-25 DIAGNOSIS — E78.5 HYPERLIPIDEMIA, UNSPECIFIED HYPERLIPIDEMIA TYPE: ICD-10-CM

## 2024-03-25 DIAGNOSIS — D75.89 MACROCYTOSIS: ICD-10-CM

## 2024-03-25 DIAGNOSIS — D75.89 MACROCYTIC: ICD-10-CM

## 2024-03-25 DIAGNOSIS — E03.9 HYPOTHYROIDISM, UNSPECIFIED TYPE: ICD-10-CM

## 2024-03-25 PROCEDURE — G0439 PPPS, SUBSEQ VISIT: HCPCS | Mod: ,,, | Performed by: INTERNAL MEDICINE

## 2024-03-25 RX ORDER — LEVOTHYROXINE SODIUM 100 UG/1
100 TABLET ORAL
Qty: 90 TABLET | Refills: 3 | Status: SHIPPED | OUTPATIENT
Start: 2024-03-25 | End: 2024-05-31

## 2024-03-25 NOTE — ASSESSMENT & PLAN NOTE
Health Maintenance Due   Topic Date Due    Hemoglobin A1c (Diabetic Prevention Screening)  Never done    RSV Vaccine (Age 60+ and Pregnant patients) (1 - 1-dose 60+ series) Never done    COVID-19 Vaccine (7 - 2023-24 season) 09/01/2023     Recent labs reviewed and discussed.  Recommended vaccines discussed.

## 2024-03-25 NOTE — PROGRESS NOTES
Subjective:        Patient Care Team:  Noris Sanchez MD as PCP - General (Internal Medicine)  Noris Sanchez MD     Chief Complaint: Medicare AWV (Discuss labs )      HPI:She is here for a medicare wellness.  No complaints. She doesn't exercise, but she is checking her bp at home.  Its been WNL.  Her log was reviewed.  Problem Noted   Encounter for Medicare Annual Wellness Exam 2023   Macrocytosis 3/25/2024   Microscopic Hematuria 2023    She's had this for over 20 years.     Lower Back Pain 2023   Hirsutism 2023   Benign Hypertension 2023   Hyperlipidemia 2023   Hypothyroidism 2023   Murmur 2023        The patient's Health Maintenance was reviewed and the following appears to be due:   Health Maintenance Due   Topic Date Due    Hemoglobin A1c (Diabetic Prevention Screening)  Never done    RSV Vaccine (Age 60+ and Pregnant patients) (1 - 1-dose 60+ series) Never done    COVID-19 Vaccine ( season) 2023       Past Medical History:  Past Medical History:   Diagnosis Date    Abnormal facial hair     Benign essential HTN     HLD (hyperlipidemia)     Hypothyroidism, unspecified      Past Surgical History:   Procedure Laterality Date     SECTION  15 years ago    HYSTERECTOMY  13 years ago    SKIN CANCER EXCISION Bilateral     2022, Oct 2022    TONSILLECTOMY  When i was 9 years old    TOTAL ABDOMINAL HYSTERECTOMY W/ BILATERAL SALPINGOOPHORECTOMY       Review of patient's allergies indicates:  No Known Allergies  Current Outpatient Medications on File Prior to Visit   Medication Sig Dispense Refill    calcium carbonate (CALCIUM 600) 600 mg calcium (1,500 mg) Tab Take 600 mg by mouth once daily.      cholecalciferol, vitamin D3, 125 mcg (5,000 unit) Tab Take 5,000 Units by mouth once daily.      hydroCHLOROthiazide (HYDRODIURIL) 25 MG tablet TAKE 1 TABLET EVERY DAY 90 tablet 3    magnesium 250 mg Tab Take 1 tablet by mouth once daily.       montelukast (SINGULAIR) 10 mg tablet TAKE 1 TABLET EVERY DAY 90 tablet 3    progesterone (PROMETRIUM) 100 MG capsule Take 100 mg by mouth once daily.      rosuvastatin (CRESTOR) 10 MG tablet TAKE 1 TABLET ONE TIME DAILY 90 tablet 3    valACYclovir (VALTREX) 500 MG tablet       valsartan (DIOVAN) 80 MG tablet Take 1 tablet (80 mg total) by mouth once daily. 90 tablet 3    [DISCONTINUED] levothyroxine (SYNTHROID) 112 MCG tablet TAKE 1 TABLET EVERY DAY 90 tablet 3    [DISCONTINUED] ketoconazole (NIZORAL) 2 % shampoo        No current facility-administered medications on file prior to visit.     Social History     Socioeconomic History    Marital status:    Tobacco Use    Smoking status: Never    Smokeless tobacco: Never   Substance and Sexual Activity    Alcohol use: Yes     Alcohol/week: 6.0 standard drinks of alcohol     Types: 6 Glasses of wine per week    Drug use: Never    Sexual activity: Yes     Partners: Male     Birth control/protection: None     Social Determinants of Health     Financial Resource Strain: Low Risk  (3/25/2024)    Overall Financial Resource Strain (CARDIA)     Difficulty of Paying Living Expenses: Not hard at all   Transportation Needs: No Transportation Needs (3/25/2024)    PRAPARE - Transportation     Lack of Transportation (Medical): No     Lack of Transportation (Non-Medical): No   Physical Activity: Sufficiently Active (3/25/2024)    Exercise Vital Sign     Days of Exercise per Week: 5 days     Minutes of Exercise per Session: 30 min   Stress: No Stress Concern Present (3/25/2024)    [employer] - Occupational Stress Questionnaire     Feeling of Stress : Not at all   Social Connections: Moderately Integrated (3/25/2024)    Social Connection and Isolation Panel [NHANES]     Frequency of Communication with Friends and Family: Three times a week     Frequency of Social Gatherings with Friends and Family: Three times a week     Attends Hinduism Services: 1  to 4 times per year     Active Member of Clubs or Organizations: No     Attends Club or Organization Meetings: Never     Marital Status:    Housing Stability: Unknown (3/25/2024)    Housing Stability Vital Sign     Unable to Pay for Housing in the Last Year: No     Unstable Housing in the Last Year: No     Family History   Problem Relation Age of Onset    Hyperlipidemia Mother     Hypertension Mother     Heart disease Mother     Hypertension Father     Stroke Father     Heart disease Father     Alcohol abuse Father     Breast cancer Sister        Review of Systems   Constitutional:  Positive for fatigue (mild).   Respiratory:  Negative for shortness of breath.    Cardiovascular:  Negative for chest pain, palpitations and leg swelling.   Gastrointestinal: Negative.    Endocrine: Negative for heat intolerance.   Genitourinary: Negative.    Neurological:  Negative for tremors.       Patient Reported Health Risk Assessment       Opioid Screening: Patient medication list reviewed, patient is not taking prescription opioids. Patient is not using additional opioids than prescribed. Patient is at low risk of substance abuse based on this opioid use history.     Objective:   /70 (BP Location: Right arm, Patient Position: Sitting, BP Method: Small (Manual))   Pulse 76   Resp 18   Ht 5' (1.524 m)   Wt 70.8 kg (156 lb)   SpO2 98%   BMI 30.47 kg/m²     Physical Exam  Constitutional:       General: She is not in acute distress.     Appearance: Normal appearance.   HENT:      Head: Normocephalic and atraumatic.   Eyes:      General: No scleral icterus.     Conjunctiva/sclera: Conjunctivae normal.   Neck:      Vascular: No carotid bruit.   Cardiovascular:      Rate and Rhythm: Normal rate and regular rhythm.      Pulses: Normal pulses.      Heart sounds: Murmur (I/VI msm\) heard.      No friction rub. No gallop.   Pulmonary:      Effort: Pulmonary effort is normal.      Breath sounds: Normal breath sounds.    Abdominal:      General: Bowel sounds are normal.      Palpations: Abdomen is soft. There is no mass.      Tenderness: There is no abdominal tenderness. There is no guarding or rebound.   Musculoskeletal:         General: No deformity.      Cervical back: No rigidity or tenderness.      Right lower leg: No edema.      Left lower leg: No edema.   Lymphadenopathy:      Cervical: No cervical adenopathy.   Skin:     Coloration: Skin is not jaundiced or pale.      Findings: No erythema.   Neurological:      General: No focal deficit present.      Mental Status: She is alert and oriented to person, place, and time.      Gait: Gait normal.   Psychiatric:         Mood and Affect: Mood normal.         Behavior: Behavior normal.         Thought Content: Thought content normal.         Judgment: Judgment normal.                No data to display                  3/25/2024     9:20 AM 9/20/2023     9:20 AM 7/31/2023    11:00 AM 3/6/2023    11:00 AM 1/20/2023     9:00 AM   Fall Risk Assessment - Outpatient   Mobility Status Ambulatory Ambulatory Ambulatory Ambulatory Ambulatory   Number of falls 0 0 0 0 0   Identified as fall risk False False False False False                Assessment and Plan:     Encounter for Medicare annual wellness exam  Health Maintenance Due   Topic Date Due    Hemoglobin A1c (Diabetic Prevention Screening)  Never done    RSV Vaccine (Age 60+ and Pregnant patients) (1 - 1-dose 60+ series) Never done    COVID-19 Vaccine (7 - 2023-24 season) 09/01/2023     Recent labs reviewed and discussed.  Recommended vaccines discussed.    Benign hypertension  Controlled.  Continue hctz and valsartan.    Hyperlipidemia  Controlled.  Continue crestor 10 mg daily.    Hypothyroidism  She is over replaced.  Will reduce dose of synthroid to 100 mcg and recheck levels in 6 weeks.    Macrocytosis  Check b12 and folate levels.   Follow up in about 6 months (around 9/25/2024).    Medications Ordered This Encounter    Medications    levothyroxine (SYNTHROID) 100 MCG tablet     Sig: Take 1 tablet (100 mcg total) by mouth before breakfast.     Dispense:  90 tablet     Refill:  3     [unfilled]  Orders Placed This Encounter   Procedures    Vitamin B12     Standing Status:   Future     Standing Expiration Date:   6/23/2025    Folate     Standing Status:   Future     Standing Expiration Date:   6/23/2025    TSH     Standing Status:   Future     Standing Expiration Date:   5/24/2025    T4, Free     Standing Status:   Future     Standing Expiration Date:   5/24/2025         Medicare Annual Wellness and Personalized Prevention Plan:   Fall Risk + Home Safety + Hearing Impairment + Depression Screen + Cognitive Impairment Screen + Health Risk Assessment all reviewed  Advance Care Planning     Date: 03/25/2024  Patient did not wish or was not able to name a surrogate decision maker or provide an Advance Care Plan. She already has a living will and medical power of .       Follow up in about 6 months (around 9/25/2024). In addition to their scheduled follow up, the patient has also been instructed to follow up on as needed basis.

## 2024-05-16 LAB
T4 FREE SERPL-MCNC: 1.8 NG/DL (ref 0.82–1.77)
TSH SERPL DL<=0.005 MIU/L-ACNC: 0.36 UIU/ML (ref 0.45–4.5)

## 2024-05-31 DIAGNOSIS — E03.9 HYPOTHYROIDISM, UNSPECIFIED TYPE: Primary | ICD-10-CM

## 2024-05-31 RX ORDER — LEVOTHYROXINE SODIUM 88 UG/1
88 TABLET ORAL
Qty: 30 TABLET | Refills: 3 | Status: SHIPPED | OUTPATIENT
Start: 2024-05-31

## 2024-05-31 NOTE — PROGRESS NOTES
Let her know the labs show she is stillon too much thyriod medication.  Let's reduce her synthroid to 88 mcg daily and recheck tsh and free t4 in 6 weeks.

## 2024-06-24 PROBLEM — Z00.00 ENCOUNTER FOR MEDICARE ANNUAL WELLNESS EXAM: Status: RESOLVED | Noted: 2023-01-20 | Resolved: 2024-06-24

## 2024-07-15 DIAGNOSIS — E03.9 HYPOTHYROIDISM, UNSPECIFIED TYPE: ICD-10-CM

## 2024-07-15 RX ORDER — LEVOTHYROXINE SODIUM 88 UG/1
88 TABLET ORAL
Qty: 90 TABLET | Refills: 1 | Status: SHIPPED | OUTPATIENT
Start: 2024-07-15

## 2024-08-21 DIAGNOSIS — I10 BENIGN HYPERTENSION: Primary | ICD-10-CM

## 2024-08-21 RX ORDER — VALSARTAN 80 MG/1
80 TABLET ORAL
Qty: 90 TABLET | Refills: 3 | Status: SHIPPED | OUTPATIENT
Start: 2024-08-21

## 2024-09-02 ENCOUNTER — PATIENT MESSAGE (OUTPATIENT)
Dept: INTERNAL MEDICINE | Facility: CLINIC | Age: 69
End: 2024-09-02
Payer: MEDICARE

## 2024-09-26 ENCOUNTER — OFFICE VISIT (OUTPATIENT)
Dept: INTERNAL MEDICINE | Facility: CLINIC | Age: 69
End: 2024-09-26
Payer: MEDICARE

## 2024-09-26 VITALS
BODY MASS INDEX: 29.84 KG/M2 | OXYGEN SATURATION: 99 % | HEIGHT: 60 IN | HEART RATE: 86 BPM | RESPIRATION RATE: 18 BRPM | WEIGHT: 152 LBS | SYSTOLIC BLOOD PRESSURE: 130 MMHG | DIASTOLIC BLOOD PRESSURE: 76 MMHG

## 2024-09-26 DIAGNOSIS — M77.9 TENDONITIS: ICD-10-CM

## 2024-09-26 DIAGNOSIS — E78.5 HYPERLIPIDEMIA, UNSPECIFIED HYPERLIPIDEMIA TYPE: ICD-10-CM

## 2024-09-26 DIAGNOSIS — E03.9 HYPOTHYROIDISM, UNSPECIFIED TYPE: ICD-10-CM

## 2024-09-26 DIAGNOSIS — N89.8 VAGINAL IRRITATION: ICD-10-CM

## 2024-09-26 DIAGNOSIS — I10 BENIGN HYPERTENSION: Primary | ICD-10-CM

## 2024-09-26 PROCEDURE — 99214 OFFICE O/P EST MOD 30 MIN: CPT | Mod: ,,, | Performed by: INTERNAL MEDICINE

## 2024-09-26 NOTE — PROGRESS NOTES
Subjective:      Chief Complaint: Follow-up (6mo)      HPI:She is here for a f/u htn, hld.  She c/o some vaginal burning since having a brazilian wax last week.  She showed me a picture.  It looks like tears.      She also c/o some pain in her rt distal arm /elbow for a few days.  She isn't sure what to do.    Her BP has been good.  I reviewd her log from home.  Problem Noted   Tendonitis 9/26/2024   Vaginal Irritation 9/26/2024   Macrocytosis 3/25/2024   Microscopic Hematuria 9/20/2023    She's had this for over 20 years.     Lower Back Pain 7/31/2023   Hirsutism 1/20/2023   Benign Hypertension 1/20/2023   Hyperlipidemia 1/20/2023   Hypothyroidism 1/20/2023   Murmur 1/20/2023   Encounter for Medicare Annual Wellness Exam (Resolved) 1/20/2023        The patient's Health Maintenance was reviewed and the following appears to be due:   Health Maintenance Due   Topic Date Due    Hemoglobin A1c (Diabetic Prevention Screening)  Never done    RSV Vaccine (Age 60+ and Pregnant patients) (1 - 1-dose 60+ series) Never done    COVID-19 Vaccine (7 - 2023-24 season) 09/01/2024    Mammogram  10/09/2024       Past Medical History:  Past Medical History:   Diagnosis Date    Abnormal facial hair     Benign essential HTN     HLD (hyperlipidemia)     Hypothyroidism, unspecified      Review of patient's allergies indicates:  No Known Allergies  Current Outpatient Medications on File Prior to Visit   Medication Sig Dispense Refill    calcium carbonate (CALCIUM 600) 600 mg calcium (1,500 mg) Tab Take 600 mg by mouth once daily.      cholecalciferol, vitamin D3, 125 mcg (5,000 unit) Tab Take 5,000 Units by mouth once daily.      hydroCHLOROthiazide (HYDRODIURIL) 25 MG tablet TAKE 1 TABLET EVERY DAY 90 tablet 3    levothyroxine (SYNTHROID) 88 MCG tablet Take 1 tablet (88 mcg total) by mouth before breakfast. 90 tablet 1    magnesium 250 mg Tab Take 1 tablet by mouth once daily.      montelukast (SINGULAIR) 10 mg tablet TAKE 1 TABLET EVERY  DAY 90 tablet 3    progesterone (PROMETRIUM) 100 MG capsule Take 100 mg by mouth once daily.      rosuvastatin (CRESTOR) 10 MG tablet TAKE 1 TABLET ONE TIME DAILY 90 tablet 3    valACYclovir (VALTREX) 500 MG tablet       valsartan (DIOVAN) 80 MG tablet TAKE 1 TABLET ONE TIME DAILY 90 tablet 3     No current facility-administered medications on file prior to visit.       Review of Systems   Constitutional: Negative.    Respiratory: Negative.     Cardiovascular: Negative.    Gastrointestinal: Negative.    Genitourinary:  Positive for dysuria.   Neurological:  Negative for light-headedness and headaches.       Objective:   /76 (BP Location: Left arm, Patient Position: Sitting, BP Method: Small (Manual))   Pulse 86   Resp 18   Ht 5' (1.524 m)   Wt 68.9 kg (152 lb)   SpO2 99%   BMI 29.69 kg/m²     Physical Exam  Constitutional:       General: She is not in acute distress.     Appearance: Normal appearance.   HENT:      Head: Normocephalic and atraumatic.   Eyes:      General: No scleral icterus.     Conjunctiva/sclera: Conjunctivae normal.   Neck:      Vascular: No carotid bruit.   Cardiovascular:      Rate and Rhythm: Normal rate and regular rhythm.      Pulses: Normal pulses.      Heart sounds: Normal heart sounds. No murmur heard.     No friction rub. No gallop.   Pulmonary:      Effort: Pulmonary effort is normal.      Breath sounds: Normal breath sounds.   Abdominal:      General: Bowel sounds are normal.      Palpations: Abdomen is soft. There is no mass.      Tenderness: There is no abdominal tenderness. There is no guarding or rebound.   Musculoskeletal:         General: No deformity.      Cervical back: No rigidity or tenderness.      Right lower leg: No edema.      Left lower leg: No edema.   Lymphadenopathy:      Cervical: No cervical adenopathy.   Skin:     Coloration: Skin is not jaundiced or pale.      Findings: No erythema.   Neurological:      General: No focal deficit present.      Mental  Status: She is alert and oriented to person, place, and time.      Gait: Gait normal.   Psychiatric:         Mood and Affect: Mood normal.         Behavior: Behavior normal.         Thought Content: Thought content normal.         Judgment: Judgment normal.       Assessment and Plan:     Benign hypertension  Controlled.  Continue hctz and valsartan.    Hyperlipidemia  Recheck 6 mos. Continue crestor 10 mg daily.    Hypothyroidism  Continue synthroid 88 mcg daily and recheck 6 mo.    Tendonitis  Seems like a tendonitis of the elbow.  Rec rest.    Vaginal irritation  The picture looks like vaginal tears.  I rec she abstain from sex until healed.      Follow up in about 6 months (around 3/26/2025).       [unfilled]  Orders Placed This Encounter   Procedures    CBC Auto Differential     Standing Status:   Future     Number of Occurrences:   1     Standing Expiration Date:   11/25/2025    Comprehensive Metabolic Panel     Standing Status:   Future     Number of Occurrences:   1     Standing Expiration Date:   11/25/2025    Lipid Panel     Standing Status:   Future     Number of Occurrences:   1     Standing Expiration Date:   3/26/2026    TSH     Standing Status:   Future     Number of Occurrences:   1     Standing Expiration Date:   11/25/2025    T4, Free     Standing Status:   Future     Number of Occurrences:   1     Standing Expiration Date:   11/25/2025

## 2024-09-27 DIAGNOSIS — E78.5 HYPERLIPIDEMIA, UNSPECIFIED HYPERLIPIDEMIA TYPE: ICD-10-CM

## 2024-09-30 RX ORDER — ROSUVASTATIN CALCIUM 10 MG/1
10 TABLET, COATED ORAL
Qty: 90 TABLET | Refills: 3 | Status: SHIPPED | OUTPATIENT
Start: 2024-09-30

## 2024-12-09 DIAGNOSIS — E03.9 HYPOTHYROIDISM, UNSPECIFIED TYPE: ICD-10-CM

## 2024-12-09 DIAGNOSIS — J45.909 ASTHMA, UNSPECIFIED ASTHMA SEVERITY, UNSPECIFIED WHETHER COMPLICATED, UNSPECIFIED WHETHER PERSISTENT: ICD-10-CM

## 2024-12-09 RX ORDER — MONTELUKAST SODIUM 10 MG/1
TABLET ORAL
Qty: 90 TABLET | Refills: 3 | Status: SHIPPED | OUTPATIENT
Start: 2024-12-09

## 2024-12-09 RX ORDER — LEVOTHYROXINE SODIUM 88 UG/1
88 TABLET ORAL
Qty: 90 TABLET | Refills: 3 | Status: SHIPPED | OUTPATIENT
Start: 2024-12-09

## 2025-01-04 DIAGNOSIS — I10 BENIGN HYPERTENSION: ICD-10-CM

## 2025-01-06 RX ORDER — HYDROCHLOROTHIAZIDE 25 MG/1
TABLET ORAL
Qty: 90 TABLET | Refills: 3 | Status: SHIPPED | OUTPATIENT
Start: 2025-01-06

## 2025-03-28 ENCOUNTER — RESULTS FOLLOW-UP (OUTPATIENT)
Dept: INTERNAL MEDICINE | Facility: CLINIC | Age: 70
End: 2025-03-28

## 2025-03-28 ENCOUNTER — OFFICE VISIT (OUTPATIENT)
Dept: INTERNAL MEDICINE | Facility: CLINIC | Age: 70
End: 2025-03-28
Payer: MEDICARE

## 2025-03-28 VITALS
WEIGHT: 149 LBS | OXYGEN SATURATION: 99 % | HEIGHT: 60 IN | HEART RATE: 97 BPM | BODY MASS INDEX: 29.25 KG/M2 | SYSTOLIC BLOOD PRESSURE: 130 MMHG | RESPIRATION RATE: 18 BRPM | DIASTOLIC BLOOD PRESSURE: 70 MMHG

## 2025-03-28 DIAGNOSIS — R30.0 DYSURIA: ICD-10-CM

## 2025-03-28 DIAGNOSIS — N89.8 VAGINAL IRRITATION: ICD-10-CM

## 2025-03-28 DIAGNOSIS — E78.5 HYPERLIPIDEMIA, UNSPECIFIED HYPERLIPIDEMIA TYPE: ICD-10-CM

## 2025-03-28 DIAGNOSIS — Z00.00 ENCOUNTER FOR MEDICARE ANNUAL WELLNESS EXAM: Primary | ICD-10-CM

## 2025-03-28 DIAGNOSIS — E03.9 HYPOTHYROIDISM, UNSPECIFIED TYPE: ICD-10-CM

## 2025-03-28 DIAGNOSIS — I10 BENIGN HYPERTENSION: ICD-10-CM

## 2025-03-28 LAB
BACTERIA #/AREA URNS AUTO: ABNORMAL /HPF
BILIRUB UR QL STRIP.AUTO: NEGATIVE
CLARITY UR: CLEAR
COLOR UR AUTO: ABNORMAL
GLUCOSE UR QL STRIP: NORMAL
HGB UR QL STRIP: ABNORMAL
KETONES UR QL STRIP: NEGATIVE
LEUKOCYTE ESTERASE UR QL STRIP: NEGATIVE
MUCOUS THREADS URNS QL MICRO: ABNORMAL /LPF
NITRITE UR QL STRIP: NEGATIVE
PH UR STRIP: 7.5 [PH]
PROT UR QL STRIP: NEGATIVE
RBC #/AREA URNS AUTO: ABNORMAL /HPF
SP GR UR STRIP.AUTO: 1.01 (ref 1–1.03)
SQUAMOUS #/AREA URNS LPF: ABNORMAL /HPF
UROBILINOGEN UR STRIP-ACNC: NORMAL
WBC #/AREA URNS AUTO: ABNORMAL /HPF

## 2025-03-28 PROCEDURE — 81001 URINALYSIS AUTO W/SCOPE: CPT | Performed by: INTERNAL MEDICINE

## 2025-03-28 RX ORDER — SCOPOLAMINE 1 MG/3D
1 PATCH, EXTENDED RELEASE TRANSDERMAL
Qty: 5 PATCH | Refills: 0 | Status: SHIPPED | OUTPATIENT
Start: 2025-03-28

## 2025-03-28 RX ORDER — PROGESTERONE 100 MG/1
100 CAPSULE ORAL NIGHTLY
Qty: 90 CAPSULE | Refills: 3 | Status: SHIPPED | OUTPATIENT
Start: 2025-03-28

## 2025-03-28 RX ORDER — VALACYCLOVIR HYDROCHLORIDE 500 MG/1
500 TABLET, FILM COATED ORAL DAILY
Qty: 90 TABLET | Refills: 3 | Status: SHIPPED | OUTPATIENT
Start: 2025-03-28

## 2025-03-28 RX ORDER — ESTRADIOL 10 UG/1
1 TABLET, FILM COATED VAGINAL
Qty: 8 TABLET | Refills: 11 | Status: SHIPPED | OUTPATIENT
Start: 2025-03-31 | End: 2026-03-31

## 2025-03-28 NOTE — PROGRESS NOTES
Subjective:        Patient Care Team:  Noris Sanchez MD as PCP - General (Internal Medicine)  Noris Sanchez MD     Chief Complaint: Medicare AWV (Discuss labs/Her gyn retired- needs the progesterone prescribed)      HPI:  History of Present Illness    CHIEF COMPLAINT:  Patient presents today for medicare wellness with vaginal burning and discomfort during intercourse.    GENITOURINARY SYMPTOMS:  She reports burning sensation at vaginal opening during attempted intercourse. She experiences pain with urination localized to the external area without internal discomfort or urinary urgency. Her symptoms have improved today compared to yesterday. She has a history of vaginal tearing.    MEDICAL HISTORY:  She underwent a total hysterectomy over 10 years ago.    FAMILY HISTORY:  She has a family history of breast cancer in her sister.    MEDICATIONS:  She takes Valtrex 500 mg daily as prophylaxis, progesterone nightly following hysterectomy with uncertain control of night sweats, and calcium supplement 500 mg daily which was previously reduced due to constipation.    SOCIAL HISTORY:  She consumes approximately 4 glasses of red wine per week with minimal liquor consumption.    RECENT SYMPTOMS:  She experienced back pain lasting 11 days following a trip to [facility] which has since resolved.    LABS:  CBC was normal. Calcium was slightly elevated at 10.5 mg/dL. Kidney function tests were normal. Liver enzymes were within normal limits. Lipid panel showed total cholesterol 162 mg/dL, HDL 39 mg/dL (low), LDL 88 mg/dL, and triglycerides 204 mg/dL (slightly elevated). Thyroid function tests were within normal range.      ROS:  ROS as indicated in HPI.         Problem Noted   Encounter for Medicare Annual Wellness Exam 1/20/2023   Tendonitis 9/26/2024   Vaginal Irritation 9/26/2024   Macrocytosis 3/25/2024   Microscopic Hematuria 9/20/2023    She's had this for over 20 years.     Lower Back Pain 7/31/2023   Hirsutism  2023   Benign Hypertension 2023   Hyperlipidemia 2023   Hypothyroidism 2023   Murmur 2023        The patient's Health Maintenance was reviewed and the following appears to be due:   Health Maintenance Due   Topic Date Due    Hemoglobin A1c (Diabetic Prevention Screening)  Never done    RSV Vaccine (Age 60+ and Pregnant patients) (1 - Risk 60-74 years 1-dose series) Never done    COVID-19 Vaccine ( season) 2024       Problem List  Active Problem List with Overview Notes    Diagnosis Date Noted    Encounter for Medicare annual wellness exam 2023    Tendonitis 2024    Vaginal irritation 2024    Macrocytosis 2024    Microscopic hematuria 2023     She's had this for over 20 years.      Lower back pain 2023    Hirsutism 2023    Benign hypertension 2023    Hyperlipidemia 2023    Hypothyroidism 2023    Murmur 2023       Past Medical History:  Past Medical History:   Diagnosis Date    Abnormal facial hair     Benign essential HTN     HLD (hyperlipidemia)     Hypothyroidism, unspecified      Past Surgical History:   Procedure Laterality Date     SECTION  15 years ago    HYSTERECTOMY  13 years ago    SKIN CANCER EXCISION Bilateral     2022, Oct 2022    TONSILLECTOMY  When i was 9 years old    TOTAL ABDOMINAL HYSTERECTOMY W/ BILATERAL SALPINGOOPHORECTOMY       Review of patient's allergies indicates:  No Known Allergies  Medications Ordered Prior to Encounter[1]  Social History[2]  Family History   Problem Relation Name Age of Onset    Hyperlipidemia Mother Darlyn altagracia loving     Hypertension Mother Darlyn frias loving     Heart disease Mother Darlyn frias loving     Hypertension Father Alexandr Hal [place]     Stroke Father Alexandr Neptalirosalio York     Heart disease Father Alexandr Neptalirosalio York     Alcohol abuse Father Alexandr Hal [place]     Breast cancer Sister         Review of Systems   Respiratory: Negative.      Cardiovascular: Negative.    Gastrointestinal: Negative.    Neurological: Negative.    Psychiatric/Behavioral: Negative.             Objective:   /70 (BP Location: Left arm, Patient Position: Sitting)   Pulse 97   Resp 18   Ht 5' (1.524 m)   Wt 67.6 kg (149 lb)   SpO2 99%   BMI 29.10 kg/m²     Physical Exam  Constitutional:       General: She is not in acute distress.     Appearance: Normal appearance.   HENT:      Head: Normocephalic and atraumatic.   Eyes:      General: No scleral icterus.     Conjunctiva/sclera: Conjunctivae normal.   Neck:      Vascular: No carotid bruit.   Cardiovascular:      Rate and Rhythm: Normal rate and regular rhythm.      Pulses: Normal pulses.      Heart sounds: Normal heart sounds. No murmur heard.     No friction rub. No gallop.   Pulmonary:      Effort: Pulmonary effort is normal.      Breath sounds: Normal breath sounds.   Abdominal:      General: Bowel sounds are normal.      Palpations: Abdomen is soft. There is no mass.      Tenderness: There is no abdominal tenderness. There is no guarding or rebound.   Musculoskeletal:         General: No deformity.      Cervical back: No rigidity or tenderness.      Right lower leg: No edema.      Left lower leg: No edema.   Lymphadenopathy:      Cervical: No cervical adenopathy.   Skin:     General: Skin is warm and dry.      Coloration: Skin is not jaundiced or pale.      Findings: No erythema.   Neurological:      General: No focal deficit present.      Mental Status: She is alert and oriented to person, place, and time.      Gait: Gait normal.   Psychiatric:         Mood and Affect: Mood normal.         Behavior: Behavior normal.         Thought Content: Thought content normal.         Judgment: Judgment normal.         Assessment and Plan:     Encounter for Medicare annual wellness exam  Health Maintenance Due   Topic Date Due    Hemoglobin A1c (Diabetic Prevention Screening)  Never done    RSV Vaccine (Age 60+ and Pregnant  patients) (1 - Risk 60-74 years 1-dose series) Never done    COVID-19 Vaccine (7 - 2024-25 season) 09/01/2024   Recent labs reviewed and discussed.  Covid booster recommended.    Advance Care Planning    Date: 03/28/2025  Patient did not wish or was not able to name a surrogate decision maker or provide an Advance Care Plan.   They already have advanced directives.        Vaginal irritation  Trial of vagifem for irritation.  This is the second time she's had issues over the past year.      Hypothyroidism  Levels at goal.  Continue synthroid at current dose.    Hyperlipidemia  Controlled.  Continue crestor.    Benign hypertension  Controlled.  Continue hctz and valsartan.   Follow up in about 6 months (around 9/28/2025).    Medications Ordered This Encounter   Medications    estradioL (VAGIFEM) 10 mcg Tab     Sig: Place 1 tablet (10 mcg total) vaginally twice a week.     Dispense:  8 tablet     Refill:  11    progesterone (PROMETRIUM) 100 MG capsule     Sig: Take 1 capsule (100 mg total) by mouth every evening.     Dispense:  90 capsule     Refill:  3    scopolamine (TRANSDERM-SCOP) 1.3-1.5 mg (1 mg over 3 days)     Sig: Place 1 patch onto the skin every 72 hours.     Dispense:  5 patch     Refill:  0    valACYclovir (VALTREX) 500 MG tablet     Sig: Take 1 tablet (500 mg total) by mouth once daily.     Dispense:  90 tablet     Refill:  3     [unfilled]  Orders Placed This Encounter   Procedures    Urinalysis, Reflex to Urine Culture     Specimen Source:   Urine     Send normal result to authorizing provider's In Basket if patient is active on MyChart::   Yes         A comprehensive HEALTH RISK ASSESSMENT was completed today. Results are summarized below:    There are NO EMOTIONAL/SOCIAL CONCERNS identified on today's screening for Social Isolation, Depression and Anxiety.    There are NO COGNITIVE FUNCTION CONCERNS identified on today's screening.  There are NO FUNCTIONAL OR SAFETY CONCERNS were identified on today's  screening for Physical Symptoms, Nutritional, Cognitive Function, Home Safety/Living Situation, Fall Risk, Activities of Daily Living, Independent Activities of Daily Living, Physical Activity, Timed Up and Go test and Whisper test.   The patient reports NO OPIOID PRESCRIPTIONS. This was confirmed through medication reconciliation and the [facility] website.    The patient is NOT A TOBACCO USER.  The patient reports NO SIGNIFICANT ALCOHOL USE.     All Questions regarding food, transportation or housing were not answered today.    Follow up in about 6 months (around 9/28/2025). In addition to their scheduled follow up, the patient has also been instructed to follow up on as needed basis.   This note was generated with the assistance of ambient listening technology. Verbal consent was obtained by the patient and accompanying visitor(s) for the recording of patient appointment to facilitate this note. I attest to having reviewed and edited the generated note for accuracy, though some syntax or spelling errors may persist. Please contact the author of this note for any clarification.           [1]   Current Outpatient Medications on File Prior to Visit   Medication Sig Dispense Refill    calcium carbonate (CALCIUM 600) 600 mg calcium (1,500 mg) Tab Take 600 mg by mouth once daily.      cholecalciferol, vitamin D3, 125 mcg (5,000 unit) Tab Take 5,000 Units by mouth once daily.      hydroCHLOROthiazide (HYDRODIURIL) 25 MG tablet TAKE 1 TABLET EVERY DAY 90 tablet 3    levothyroxine (SYNTHROID) 88 MCG tablet TAKE 1 TABLET BEFORE BREAKFAST 90 tablet 3    magnesium 250 mg Tab Take 1 tablet by mouth once daily.      montelukast (SINGULAIR) 10 mg tablet TAKE 1 TABLET EVERY DAY 90 tablet 3    rosuvastatin (CRESTOR) 10 MG tablet TAKE 1 TABLET EVERY DAY 90 tablet 3    valsartan (DIOVAN) 80 MG tablet TAKE 1 TABLET ONE TIME DAILY 90 tablet 3    [DISCONTINUED] progesterone (PROMETRIUM) 100 MG capsule Take 100 mg by mouth once daily.       [DISCONTINUED] valACYclovir (VALTREX) 500 MG tablet Take 500 mg by mouth once daily.       No current facility-administered medications on file prior to visit.   [2]   Social History  Socioeconomic History    Marital status:    Tobacco Use    Smoking status: Never    Smokeless tobacco: Never   Substance and Sexual Activity    Alcohol use: Yes     Alcohol/week: 6.0 standard drinks of alcohol     Types: 6 Glasses of wine per week    Drug use: Never    Sexual activity: Yes     Partners: Male     Birth control/protection: None     Social Drivers of Health     Financial Resource Strain: Low Risk  (3/25/2025)    Overall Financial Resource Strain (CARDIA)     Difficulty of Paying Living Expenses: Not hard at all   Food Insecurity: No Food Insecurity (3/25/2025)    Hunger Vital Sign     Worried About Running Out of Food in the Last Year: Never true     Ran Out of Food in the Last Year: Never true   Transportation Needs: No Transportation Needs (3/25/2025)    PRAPARE - Transportation     Lack of Transportation (Medical): No     Lack of Transportation (Non-Medical): No   Physical Activity: Insufficiently Active (3/25/2025)    Exercise Vital Sign     Days of Exercise per Week: 2 days     Minutes of Exercise per Session: 10 min   Stress: No Stress Concern Present (3/25/2025)    [employer] [place] [employer] - Occupational Stress Questionnaire     Feeling of Stress : Only a little   Housing Stability: Low Risk  (3/25/2025)    Housing Stability Vital Sign     Unable to Pay for Housing in the Last Year: No     Number of Times Moved in the Last Year: 0     Homeless in the Last Year: No

## 2025-03-28 NOTE — ASSESSMENT & PLAN NOTE
Health Maintenance Due   Topic Date Due    Hemoglobin A1c (Diabetic Prevention Screening)  Never done    RSV Vaccine (Age 60+ and Pregnant patients) (1 - Risk 60-74 years 1-dose series) Never done    COVID-19 Vaccine (7 - 2024-25 season) 09/01/2024   Recent labs reviewed and discussed.  Covid booster recommended.    Advance Care Planning     Date: 03/28/2025  Patient did not wish or was not able to name a surrogate decision maker or provide an Advance Care Plan.   They already have advanced directives.

## 2025-05-20 ENCOUNTER — HOSPITAL ENCOUNTER (OUTPATIENT)
Dept: RADIOLOGY | Facility: HOSPITAL | Age: 70
Discharge: HOME | End: 2025-05-20
Attending: NURSE PRACTITIONER
Payer: MEDICARE

## 2025-05-20 ENCOUNTER — OFFICE VISIT (OUTPATIENT)
Dept: INTERNAL MEDICINE | Facility: CLINIC | Age: 70
End: 2025-05-20
Payer: MEDICARE

## 2025-05-20 VITALS
BODY MASS INDEX: 29.45 KG/M2 | HEART RATE: 100 BPM | SYSTOLIC BLOOD PRESSURE: 138 MMHG | HEIGHT: 60 IN | WEIGHT: 150 LBS | DIASTOLIC BLOOD PRESSURE: 80 MMHG | OXYGEN SATURATION: 99 %

## 2025-05-20 DIAGNOSIS — M54.50 ACUTE RIGHT-SIDED LOW BACK PAIN WITHOUT SCIATICA: ICD-10-CM

## 2025-05-20 DIAGNOSIS — M54.50 ACUTE RIGHT-SIDED LOW BACK PAIN WITHOUT SCIATICA: Primary | ICD-10-CM

## 2025-05-20 PROCEDURE — 96372 THER/PROPH/DIAG INJ SC/IM: CPT | Mod: ,,, | Performed by: NURSE PRACTITIONER

## 2025-05-20 PROCEDURE — 72110 X-RAY EXAM L-2 SPINE 4/>VWS: CPT | Mod: TC

## 2025-05-20 PROCEDURE — 99214 OFFICE O/P EST MOD 30 MIN: CPT | Mod: 25,,, | Performed by: NURSE PRACTITIONER

## 2025-05-20 RX ORDER — TRAMADOL HYDROCHLORIDE 50 MG/1
50 TABLET, FILM COATED ORAL EVERY 12 HOURS PRN
Qty: 10 TABLET | Refills: 0 | Status: SHIPPED | OUTPATIENT
Start: 2025-05-20

## 2025-05-20 RX ORDER — METHOCARBAMOL 500 MG/1
500 TABLET, FILM COATED ORAL 4 TIMES DAILY PRN
Qty: 40 TABLET | Refills: 0 | Status: SHIPPED | OUTPATIENT
Start: 2025-05-20 | End: 2025-05-30

## 2025-05-20 RX ORDER — DEXAMETHASONE SODIUM PHOSPHATE 4 MG/ML
8 INJECTION, SOLUTION INTRA-ARTICULAR; INTRALESIONAL; INTRAMUSCULAR; INTRAVENOUS; SOFT TISSUE ONCE
Status: COMPLETED | OUTPATIENT
Start: 2025-05-20 | End: 2025-05-20

## 2025-05-20 RX ORDER — DEXTROMETHORPHAN HYDROBROMIDE, GUAIFENESIN 5; 100 MG/5ML; MG/5ML
1300 LIQUID ORAL EVERY 8 HOURS
COMMUNITY
Start: 2025-05-20

## 2025-05-20 RX ORDER — IBUPROFEN 200 MG
400 TABLET ORAL EVERY 4 HOURS PRN
COMMUNITY

## 2025-05-20 RX ADMIN — DEXAMETHASONE SODIUM PHOSPHATE 8 MG: 4 INJECTION, SOLUTION INTRA-ARTICULAR; INTRALESIONAL; INTRAMUSCULAR; INTRAVENOUS; SOFT TISSUE at 09:05

## 2025-05-20 NOTE — PROGRESS NOTES
Subjective:      Patient ID: Indira Benton is a 70 y.o. female.    Chief Complaint: Flank Pain (Right side)      HPI: Patient here today for complaints of RLBP intermittently x 4 months, worse over the last 10 days. She reports pain is affecting sleep and feeling irritable.  Rates pain 7/10. She has attempted Advil. S/s are not constant. Denies provoking factors. Some improvement with use of heat. Pain does get worse with prolonged mobility. Denies falls, trauma, gross hematuria. She did pull weeds about one week ago.     Review of patient's allergies indicates:  No Known Allergies    Review of Systems  Constitutional: No fever, No chills, No sweats, No fatigue  Respiratory: No shortness of breath, No exertional dyspnea.   Cardiovascular: No chest pain, No palpitation  Gastrointestinal: No nausea, No vomiting, No diarrhea, No rectal bleeding, No constipation, No abdominal pain.  Genitourinary: No dysuria, No hematuria, No frequency.  Musculoskeletal: R low back pain without radiation  Integumentary: No rash, No ecchymosis.    Objective:   Visit Vitals  /80 (BP Location: Left arm, Patient Position: Sitting)   Pulse 100   Ht 5' (1.524 m)   Wt 68 kg (150 lb)   SpO2 99%   BMI 29.29 kg/m²     The patient's weight trend is below:   Wt Readings from Last 4 Encounters:   05/20/25 68 kg (150 lb)   03/28/25 67.6 kg (149 lb)   09/26/24 68.9 kg (152 lb)   03/25/24 70.8 kg (156 lb)        Physical Exam  Vitals and nursing note reviewed.   Constitutional:       General: She is not in acute distress.     Appearance: Normal appearance. She is normal weight. She is not ill-appearing, toxic-appearing or diaphoretic.   HENT:      Head: Normocephalic and atraumatic.   Cardiovascular:      Rate and Rhythm: Normal rate and regular rhythm.      Heart sounds: Normal heart sounds.   Pulmonary:      Effort: Pulmonary effort is normal.      Breath sounds: Normal breath sounds.   Skin:     General: Skin is warm and dry.   Neurological:       General: No focal deficit present.      Mental Status: She is alert and oriented to person, place, and time. Mental status is at baseline.   Psychiatric:         Mood and Affect: Mood normal.         Behavior: Behavior normal.         Thought Content: Thought content normal.         Judgment: Judgment normal.         Assessment/Plan:   1. Acute right-sided low back pain without sciatica  Assessment & Plan:  Tylenol/Motrin as needed  Ice/heat  XR L spine  Refer to PT  Decadron IM today--advised of possible s/e, benefits, and purpose of medication  RX Tramadol for severe pain only and Robaxin as needed with caution  If no relief with measures, consider further eval with MRI L spine or CT abdomen/pelvis r/o urologic etiology    Orders:  -     X-Ray Lumbar Spine Ap Lateral w/Flex Ext; Future; Expected date: 05/20/2025  -     Ambulatory Referral/Consult to Physical Therapy  -     traMADoL (ULTRAM) 50 mg tablet; Take 1 tablet (50 mg total) by mouth every 12 (twelve) hours as needed for Pain.  Dispense: 10 tablet; Refill: 0  -     acetaminophen (TYLENOL) 650 MG TbSR; Take 2 tablets (1,300 mg total) by mouth every 8 (eight) hours.  -     methocarbamoL (ROBAXIN) 500 MG Tab; Take 1 tablet (500 mg total) by mouth 4 (four) times daily as needed (muscle pain).  Dispense: 40 tablet; Refill: 0  -     dexAMETHasone injection 8 mg         Medication List with Changes/Refills   New Medications    ACETAMINOPHEN (TYLENOL) 650 MG TBSR    Take 2 tablets (1,300 mg total) by mouth every 8 (eight) hours.    METHOCARBAMOL (ROBAXIN) 500 MG TAB    Take 1 tablet (500 mg total) by mouth 4 (four) times daily as needed (muscle pain).    TRAMADOL (ULTRAM) 50 MG TABLET    Take 1 tablet (50 mg total) by mouth every 12 (twelve) hours as needed for Pain.   Current Medications    CALCIUM CARBONATE (CALCIUM 600) 600 MG CALCIUM (1,500 MG) TAB    Take 600 mg by mouth once daily.    CHOLECALCIFEROL, VITAMIN D3, 125 MCG (5,000 UNIT) TAB    Take 5,000  "Units by mouth once daily.    ESTRADIOL (VAGIFEM) 10 MCG TAB    Place 1 tablet (10 mcg total) vaginally twice a week.    HYDROCHLOROTHIAZIDE (HYDRODIURIL) 25 MG TABLET    TAKE 1 TABLET EVERY DAY    IBUPROFEN (ADVIL,MOTRIN) 200 MG TABLET    Take 400 mg by mouth every 4 (four) hours as needed for Pain.    LEVOTHYROXINE (SYNTHROID) 88 MCG TABLET    TAKE 1 TABLET BEFORE BREAKFAST    MAGNESIUM 250 MG TAB    Take 1 tablet by mouth once daily.    MONTELUKAST (SINGULAIR) 10 MG TABLET    TAKE 1 TABLET EVERY DAY    PROGESTERONE (PROMETRIUM) 100 MG CAPSULE    Take 1 capsule (100 mg total) by mouth every evening.    ROSUVASTATIN (CRESTOR) 10 MG TABLET    TAKE 1 TABLET EVERY DAY    SCOPOLAMINE (TRANSDERM-SCOP) 1.3-1.5 MG (1 MG OVER 3 DAYS)    Place 1 patch onto the skin every 72 hours.    VALACYCLOVIR (VALTREX) 500 MG TABLET    Take 1 tablet (500 mg total) by mouth once daily.    VALSARTAN (DIOVAN) 80 MG TABLET    TAKE 1 TABLET ONE TIME DAILY        Follow up if symptoms worsen or fail to improve.    Chemistry:  Lab Results   Component Value Date     03/11/2025    K 4.3 03/11/2025    BUN 22 03/11/2025    CREATININE 0.97 03/11/2025    EGFRNORACEVR 63 03/11/2025    CALCIUM 10.5 (H) 03/11/2025    ALBUMIN 4.8 03/11/2025    AST 22 03/11/2025    ALT 23 03/11/2025        No results found for: "HGBA1C", "MICROALBCREA"     Hematology:  Lab Results   Component Value Date    WBC 6.3 03/11/2025    HGB 12.6 03/11/2025    HCT 38.2 03/11/2025     03/11/2025       Lipid Panel:  Lab Results   Component Value Date    CHOL 162 03/11/2025    HDL 39 (L) 03/11/2025    LDL 94 01/11/2022    TRIG 204 (H) 03/11/2025        Urine:  Lab Results   Component Value Date    APPEARANCEUA Clear 03/28/2025    SGUA 1.010 03/28/2025    PROTEINUA Negative 03/28/2025    KETONESUA Negative 03/28/2025    LEUKOCYTESUR Negative 03/28/2025    RBCUA 11-20 (A) 03/28/2025    WBCUA 0-5 03/28/2025    BACTERIA Trace 03/28/2025    SQEPUA Trace 03/28/2025    "     Future Appointments   Date Time Provider Department Center   5/20/2025 10:30 AM [facility] XR1 DR Sotelo [facility] XRAY [facility]   9/23/2025 10:20 AM Noris Sanchez MD [id number]   4/2/2026  9:20 AM Noris Sanchez MD [facility]

## 2025-05-20 NOTE — ASSESSMENT & PLAN NOTE
Tylenol/Motrin as needed  Ice/heat  XR L spine  Refer to PT  Decadron IM today--advised of possible s/e, benefits, and purpose of medication  RX Tramadol for severe pain only and Robaxin as needed with caution  If no relief with measures, consider further eval with MRI L spine or CT abdomen/pelvis r/o urologic etiology

## 2025-05-26 ENCOUNTER — TELEPHONE (OUTPATIENT)
Dept: INTERNAL MEDICINE | Facility: CLINIC | Age: 70
End: 2025-05-26
Payer: MEDICARE

## 2025-05-26 ENCOUNTER — RESULTS FOLLOW-UP (OUTPATIENT)
Dept: INTERNAL MEDICINE | Facility: CLINIC | Age: 70
End: 2025-05-26

## 2025-05-26 NOTE — TELEPHONE ENCOUNTER
----- Message from Padmini Florence NP sent at 5/26/2025  7:55 AM CDT -----  Please let patient know that XR deg changes only. How is she feeling? Rec to proceed with POC. Follow up as needed.  ----- Message -----  From: Deandra, Rad Results In  Sent: 5/23/2025   2:43 PM CDT  To: Padmini Florence NP

## 2025-05-26 NOTE — TELEPHONE ENCOUNTER
Informed pt of results. She v/u. Patient stated with inj, meds, & recommendations she is feeling much better.

## 2025-05-27 ENCOUNTER — TELEPHONE (OUTPATIENT)
Dept: INTERNAL MEDICINE | Facility: CLINIC | Age: 70
End: 2025-05-27
Payer: MEDICARE

## 2025-05-27 NOTE — TELEPHONE ENCOUNTER
Copied from CRM #[id number]. Topic: General Inquiry - Patient Advice  >> May 27, 2025  8:24 AM Breana wrote:  .Who Called: physical therapy          Patient's Preferred Phone Number on File: [phone]   Best Call Back Number, if different:  Additional Information: for the referral they only got demographic please resend everything

## 2025-06-30 ENCOUNTER — OFFICE VISIT (OUTPATIENT)
Dept: INTERNAL MEDICINE | Facility: CLINIC | Age: 70
End: 2025-06-30
Payer: MEDICARE

## 2025-06-30 VITALS
BODY MASS INDEX: 29.64 KG/M2 | WEIGHT: 151 LBS | DIASTOLIC BLOOD PRESSURE: 88 MMHG | RESPIRATION RATE: 18 BRPM | SYSTOLIC BLOOD PRESSURE: 145 MMHG | OXYGEN SATURATION: 99 % | HEART RATE: 76 BPM | HEIGHT: 60 IN

## 2025-06-30 DIAGNOSIS — R42 VERTIGO: Primary | ICD-10-CM

## 2025-06-30 PROCEDURE — 99213 OFFICE O/P EST LOW 20 MIN: CPT | Mod: ,,, | Performed by: INTERNAL MEDICINE

## 2025-06-30 RX ORDER — MECLIZINE HCL 12.5 MG 12.5 MG/1
12.5 TABLET ORAL 3 TIMES DAILY PRN
Qty: 30 TABLET | Refills: 1 | Status: SHIPPED | OUTPATIENT
Start: 2025-06-30 | End: 2025-06-30

## 2025-06-30 RX ORDER — METHYLPREDNISOLONE 4 MG/1
TABLET ORAL
Qty: 21 EACH | Refills: 0 | Status: SHIPPED | OUTPATIENT
Start: 2025-06-30

## 2025-06-30 RX ORDER — METHYLPREDNISOLONE 4 MG/1
TABLET ORAL
Qty: 21 EACH | Refills: 0 | Status: SHIPPED | OUTPATIENT
Start: 2025-06-30 | End: 2025-06-30

## 2025-06-30 RX ORDER — MECLIZINE HCL 12.5 MG 12.5 MG/1
12.5 TABLET ORAL 3 TIMES DAILY PRN
Qty: 30 TABLET | Refills: 1 | Status: SHIPPED | OUTPATIENT
Start: 2025-06-30

## 2025-06-30 RX ORDER — METHOCARBAMOL 500 MG/1
TABLET, FILM COATED ORAL 4 TIMES DAILY PRN
COMMUNITY

## 2025-06-30 NOTE — ASSESSMENT & PLAN NOTE
I suspect BPPV.  I think the redness on the left TM is from the cleaning.  I rec she come back over the coming days if the ear is still bothering her so I can look at it again.  I gave her a medrol dosepak to try since she has some sinus pressure on that side and meclizine prn.  I gave her the exercises for the BPPV -- modified tricia.  She was told to call if sx persist or worsen

## 2025-06-30 NOTE — PROGRESS NOTES
Subjective:      Chief Complaint: Dizziness (C/o vertigo X1day this morning after she went to the bathroom/She had this once 4 yrs ago )      HPI:She is here w/ complaints of vertigo.  It started this morning.  It is worse when she turns her head.  And she is noticing some popping in her left ear and some sinus pressure.  No fevers or chills.  She did have some diaphoresis with the vertigo.  No n/v.  She had vertigo about 3-4 years ago.  She took meclizine at that time.  She has a mild pressure.  She can walk ok.    Problem Noted   Encounter for Medicare Annual Wellness Exam 1/20/2023   Vertigo 6/30/2025   Tendonitis 9/26/2024   Vaginal Irritation 9/26/2024   Macrocytosis 3/25/2024   Microscopic Hematuria 9/20/2023    She's had this for over 20 years.     Lower Back Pain 7/31/2023   Hirsutism 1/20/2023   Benign Hypertension 1/20/2023   Hyperlipidemia 1/20/2023   Hypothyroidism 1/20/2023   Murmur 1/20/2023        The patient's Health Maintenance was reviewed and the following appears to be due:   Health Maintenance Due   Topic Date Due    Hemoglobin A1c (Diabetic Prevention Screening)  Never done    RSV Vaccine (Age 60+ and Pregnant patients) (1 - Risk 60-74 years 1-dose series) Never done       Past Medical History:  Past Medical History:   Diagnosis Date    Abnormal facial hair     Benign essential HTN     HLD (hyperlipidemia)     Hypothyroidism, unspecified      Review of patient's allergies indicates:  No Known Allergies  Medications Ordered Prior to Encounter[1]    Review of Systems    Objective:   BP (!) 145/88 (BP Location: Right arm, Patient Position: Sitting)   Pulse 76   Resp 18   Ht 5' (1.524 m)   Wt 68.5 kg (151 lb)   SpO2 99%   BMI 29.49 kg/m²     Physical Exam  Vitals reviewed.   Constitutional:       General: She is not in acute distress.     Appearance: Normal appearance. She is not ill-appearing or diaphoretic.   HENT:      Head: Normocephalic and atraumatic.      Right Ear: Tympanic membrane,  ear canal and external ear normal.      Ears:      Comments: She had some wax blocking her TM on the left .  Aftr clearning, there was some redness on the TM and in the external canal.  She doesn't have any tragal tenderness.  She has a good light reflex.  I don't see any bubbles.  I think the redness may be from the cleaning.  + deirdre hallpike on the left side.  Pulmonary:      Effort: Pulmonary effort is normal.   Skin:     General: Skin is warm and dry.   Neurological:      General: No focal deficit present.      Mental Status: She is alert.      Comments: No nystagmus.   Psychiatric:         Mood and Affect: Mood normal.         Behavior: Behavior normal.         Thought Content: Thought content normal.         Judgment: Judgment normal.       Assessment and Plan:     Vertigo  I suspect BPPV.  I think the redness on the left TM is from the cleaning.  I rec she come back over the coming days if the ear is still bothering her so I can look at it again.  I gave her a medrol dosepak to try since she has some sinus pressure on that side and meclizine prn.  I gave her the exercises for the BPPV -- modified tricia.  She was told to call if sx persist or worsen      No follow-ups on file.    Medications Ordered This Encounter   Medications    meclizine (ANTIVERT) 12.5 mg tablet     Sig: Take 1 tablet (12.5 mg total) by mouth 3 (three) times daily as needed for Dizziness.     Dispense:  30 tablet     Refill:  1    methylPREDNISolone (MEDROL DOSEPACK) 4 mg tablet     Sig: use as directed     Dispense:  21 each     Refill:  0     [unfilled]  No orders of the defined types were placed in this encounter.           [1]   Current Outpatient Medications on File Prior to Visit   Medication Sig Dispense Refill    acetaminophen (TYLENOL) 650 MG TbSR Take 2 tablets (1,300 mg total) by mouth every 8 (eight) hours.      calcium carbonate (CALCIUM 600) 600 mg calcium (1,500 mg) Tab Take 600 mg by mouth once daily.      cholecalciferol,  vitamin D3, 125 mcg (5,000 unit) Tab Take 5,000 Units by mouth once daily.      estradioL (VAGIFEM) 10 mcg Tab Place 1 tablet (10 mcg total) vaginally twice a week. 8 tablet 11    hydroCHLOROthiazide (HYDRODIURIL) 25 MG tablet TAKE 1 TABLET EVERY DAY 90 tablet 3    ibuprofen (ADVIL,MOTRIN) 200 MG tablet Take 400 mg by mouth every 4 (four) hours as needed for Pain.      levothyroxine (SYNTHROID) 88 MCG tablet TAKE 1 TABLET BEFORE BREAKFAST 90 tablet 3    magnesium 250 mg Tab Take 1 tablet by mouth once daily.      methocarbamoL (ROBAXIN) 500 MG Tab Take by mouth 4 (four) times daily as needed.      montelukast (SINGULAIR) 10 mg tablet TAKE 1 TABLET EVERY DAY 90 tablet 3    progesterone (PROMETRIUM) 100 MG capsule Take 1 capsule (100 mg total) by mouth every evening. 90 capsule 3    rosuvastatin (CRESTOR) 10 MG tablet TAKE 1 TABLET EVERY DAY 90 tablet 3    scopolamine (TRANSDERM-SCOP) 1.3-1.5 mg (1 mg over 3 days) Place 1 patch onto the skin every 72 hours. 5 patch 0    traMADoL (ULTRAM) 50 mg tablet Take 1 tablet (50 mg total) by mouth every 12 (twelve) hours as needed for Pain. 10 tablet 0    valACYclovir (VALTREX) 500 MG tablet Take 1 tablet (500 mg total) by mouth once daily. 90 tablet 3    valsartan (DIOVAN) 80 MG tablet TAKE 1 TABLET ONE TIME DAILY 90 tablet 3     No current facility-administered medications on file prior to visit.

## 2025-07-10 NOTE — PROGRESS NOTES
Subjective:      Patient ID: Indira Benton is a 70 y.o. female.    Chief Complaint: Ear fullness and Sinus pressure      HPI: Patient here today for complaints of L ear congestion. Reports that she saw Dr. Sanchez 6/30. Reviewed OV note/tx with meclizine RX.  She also completed MDP last week. Leaving Wednesday for [place].  No fever, chills, or sweats.  She took Zyrtec last night. S/s seem to be a bit better today.    We did extensively discuss s/e of previously prescribed scopolamine patch and possible need to hold Zyrtec due to anticholinergic s/e.    Review of patient's allergies indicates:  No Known Allergies    Review of Systems  Constitutional: No fever, No chills, No sweats, No fatigue  Ear/Nose/Mouth/Throat: No nasal congestion, No vertigo. L ear congestion  Respiratory: No shortness of breath, No cough, No sputum production, No wheezing, No exertional dyspnea.     Objective:   Visit Vitals  /60 (BP Location: Right arm, Patient Position: Sitting)   Pulse 92   Ht 5' (1.524 m)   Wt 68.9 kg (152 lb)   SpO2 99%   BMI 29.69 kg/m²     The patient's weight trend is below:   Wt Readings from Last 4 Encounters:   07/11/25 68.9 kg (152 lb)   06/30/25 68.5 kg (151 lb)   05/20/25 68 kg (150 lb)   03/28/25 67.6 kg (149 lb)        Physical Exam  Vitals and nursing note reviewed.   Constitutional:       General: She is not in acute distress.     Appearance: Normal appearance. She is normal weight. She is not ill-appearing, toxic-appearing or diaphoretic.   HENT:      Head: Normocephalic and atraumatic.      Right Ear: Tympanic membrane, ear canal and external ear normal.      Left Ear: Tympanic membrane, ear canal and external ear normal.      Nose: No congestion or rhinorrhea.      Mouth/Throat:      Mouth: Mucous membranes are moist.      Pharynx: Oropharynx is clear. No oropharyngeal exudate or posterior oropharyngeal erythema.   Cardiovascular:      Rate and Rhythm: Normal rate and regular rhythm.      Pulses:  Normal pulses.      Heart sounds: Normal heart sounds. No murmur heard.  Pulmonary:      Effort: Pulmonary effort is normal. No respiratory distress.      Breath sounds: Normal breath sounds. No stridor. No wheezing, rhonchi or rales.   Musculoskeletal:         General: Normal range of motion.      Cervical back: Normal range of motion and neck supple. No rigidity or tenderness.   Lymphadenopathy:      Cervical: No cervical adenopathy.   Skin:     General: Skin is warm and dry.   Neurological:      General: No focal deficit present.      Mental Status: She is alert and oriented to person, place, and time. Mental status is at baseline.      Motor: No weakness.   Psychiatric:         Mood and Affect: Mood normal.         Behavior: Behavior normal.         Thought Content: Thought content normal.         Judgment: Judgment normal.         Assessment/Plan:   1. Congestion of left ear  Assessment & Plan:  Continue Flonase and Zyrtec daily  Will RX prednisone 20mg to be taken daily x 3-5 days if s/s recur/worsen  Follow up as needed    Orders:  -     predniSONE (DELTASONE) 20 MG tablet; Take 1 tablet (20 mg total) by mouth once daily. for 5 days  Dispense: 5 tablet; Refill: 0    2. Vertigo  Assessment & Plan:  Resolve noted           Medication List with Changes/Refills   New Medications    PREDNISONE (DELTASONE) 20 MG TABLET    Take 1 tablet (20 mg total) by mouth once daily. for 5 days   Current Medications    ACETAMINOPHEN (TYLENOL) 650 MG TBSR    Take 2 tablets (1,300 mg total) by mouth every 8 (eight) hours.    CALCIUM CARBONATE (CALCIUM 600) 600 MG CALCIUM (1,500 MG) TAB    Take 600 mg by mouth once daily.    CETIRIZINE (ZYRTEC) 10 MG TABLET    Take 10 mg by mouth once daily.    CHOLECALCIFEROL, VITAMIN D3, 125 MCG (5,000 UNIT) TAB    Take 5,000 Units by mouth once daily.    ESTRADIOL (VAGIFEM) 10 MCG TAB    Place 1 tablet (10 mcg total) vaginally twice a week.    HYDROCHLOROTHIAZIDE (HYDRODIURIL) 25 MG TABLET     "TAKE 1 TABLET EVERY DAY    IBUPROFEN (ADVIL,MOTRIN) 200 MG TABLET    Take 400 mg by mouth every 4 (four) hours as needed for Pain.    LEVOTHYROXINE (SYNTHROID) 88 MCG TABLET    TAKE 1 TABLET BEFORE BREAKFAST    MAGNESIUM 250 MG TAB    Take 1 tablet by mouth once daily.    MECLIZINE (ANTIVERT) 12.5 MG TABLET    Take 1 tablet (12.5 mg total) by mouth 3 (three) times daily as needed for Dizziness.    METHOCARBAMOL (ROBAXIN) 500 MG TAB    Take by mouth 4 (four) times daily as needed.    MONTELUKAST (SINGULAIR) 10 MG TABLET    TAKE 1 TABLET EVERY DAY    PROGESTERONE (PROMETRIUM) 100 MG CAPSULE    Take 1 capsule (100 mg total) by mouth every evening.    ROSUVASTATIN (CRESTOR) 10 MG TABLET    TAKE 1 TABLET EVERY DAY    SCOPOLAMINE (TRANSDERM-SCOP) 1.3-1.5 MG (1 MG OVER 3 DAYS)    Place 1 patch onto the skin every 72 hours.    TRAMADOL (ULTRAM) 50 MG TABLET    Take 1 tablet (50 mg total) by mouth every 12 (twelve) hours as needed for Pain.    VALACYCLOVIR (VALTREX) 500 MG TABLET    Take 1 tablet (500 mg total) by mouth once daily.    VALSARTAN (DIOVAN) 80 MG TABLET    TAKE 1 TABLET ONE TIME DAILY   Discontinued Medications    METHYLPREDNISOLONE (MEDROL DOSEPACK) 4 MG TABLET    use as directed        Follow up if symptoms worsen or fail to improve.    Chemistry:  Lab Results   Component Value Date     03/11/2025    K 4.3 03/11/2025    BUN 22 03/11/2025    CREATININE 0.97 03/11/2025    EGFRNORACEVR 63 03/11/2025    CALCIUM 10.5 (H) 03/11/2025    ALBUMIN 4.8 03/11/2025    AST 22 03/11/2025    ALT 23 03/11/2025        No results found for: "HGBA1C", "MICROALBCREA"     Hematology:  Lab Results   Component Value Date    WBC 6.3 03/11/2025    HGB 12.6 03/11/2025    HCT 38.2 03/11/2025     03/11/2025       Lipid Panel:  Lab Results   Component Value Date    CHOL 162 03/11/2025    HDL 39 (L) 03/11/2025    LDL 94 01/11/2022    TRIG 204 (H) 03/11/2025        Urine:  Lab Results   Component Value Date    APPEARANCEUA Clear " 03/28/2025    SGUA 1.010 03/28/2025    PROTEINUA Negative 03/28/2025    KETONESUA Negative 03/28/2025    LEUKOCYTESUR Negative 03/28/2025    RBCUA 11-20 (A) 03/28/2025    WBCUA 0-5 03/28/2025    BACTERIA Trace 03/28/2025    SQEPUA Trace 03/28/2025        Future Appointments   Date Time Provider Department Center   9/23/2025 10:20 AM Noris Sanchez MD [facility] [id number]  [facility]   4/2/2026  9:20 AM Noris Sanchez MD [facility] [id number]  [place]

## 2025-07-11 ENCOUNTER — OFFICE VISIT (OUTPATIENT)
Dept: INTERNAL MEDICINE | Facility: CLINIC | Age: 70
End: 2025-07-11
Payer: MEDICARE

## 2025-07-11 VITALS
HEART RATE: 92 BPM | OXYGEN SATURATION: 99 % | SYSTOLIC BLOOD PRESSURE: 104 MMHG | WEIGHT: 152 LBS | BODY MASS INDEX: 29.84 KG/M2 | DIASTOLIC BLOOD PRESSURE: 60 MMHG | HEIGHT: 60 IN

## 2025-07-11 DIAGNOSIS — R42 VERTIGO: ICD-10-CM

## 2025-07-11 DIAGNOSIS — H93.8X2 CONGESTION OF LEFT EAR: Primary | ICD-10-CM

## 2025-07-11 RX ORDER — PREDNISONE 20 MG/1
20 TABLET ORAL DAILY
Qty: 5 TABLET | Refills: 0 | Status: SHIPPED | OUTPATIENT
Start: 2025-07-11 | End: 2025-07-16

## 2025-07-11 RX ORDER — CETIRIZINE HYDROCHLORIDE 10 MG/1
10 TABLET ORAL DAILY
COMMUNITY

## 2025-07-11 NOTE — ASSESSMENT & PLAN NOTE
Continue Flonase and Zyrtec daily  Will RX prednisone 20mg to be taken daily x 3-5 days if s/s recur/worsen  Follow up as needed

## 2025-07-24 DIAGNOSIS — I10 BENIGN HYPERTENSION: ICD-10-CM

## 2025-07-24 RX ORDER — VALSARTAN 80 MG/1
80 TABLET ORAL
Qty: 90 TABLET | Refills: 3 | Status: SHIPPED | OUTPATIENT
Start: 2025-07-24